# Patient Record
Sex: FEMALE | Race: WHITE
[De-identification: names, ages, dates, MRNs, and addresses within clinical notes are randomized per-mention and may not be internally consistent; named-entity substitution may affect disease eponyms.]

---

## 2019-02-08 ENCOUNTER — HOSPITAL ENCOUNTER (INPATIENT)
Dept: HOSPITAL 91 - MS1 | Age: 59
LOS: 7 days | Discharge: HOME | DRG: 418 | End: 2019-02-15
Payer: MEDICAID

## 2019-02-08 ENCOUNTER — HOSPITAL ENCOUNTER (INPATIENT)
Dept: HOSPITAL 10 - E/R | Age: 59
LOS: 7 days | Discharge: HOME | DRG: 418 | End: 2019-02-15
Payer: COMMERCIAL

## 2019-02-08 VITALS
HEIGHT: 63 IN | BODY MASS INDEX: 22.3 KG/M2 | HEIGHT: 63 IN | BODY MASS INDEX: 22.3 KG/M2 | WEIGHT: 125.88 LBS | WEIGHT: 125.88 LBS

## 2019-02-08 VITALS — RESPIRATION RATE: 18 BRPM | HEART RATE: 82 BPM | DIASTOLIC BLOOD PRESSURE: 70 MMHG | SYSTOLIC BLOOD PRESSURE: 133 MMHG

## 2019-02-08 DIAGNOSIS — K76.0: ICD-10-CM

## 2019-02-08 DIAGNOSIS — E11.9: ICD-10-CM

## 2019-02-08 DIAGNOSIS — E05.90: ICD-10-CM

## 2019-02-08 DIAGNOSIS — E87.1: ICD-10-CM

## 2019-02-08 DIAGNOSIS — K80.20: Primary | ICD-10-CM

## 2019-02-08 DIAGNOSIS — E88.09: ICD-10-CM

## 2019-02-08 DIAGNOSIS — N30.90: ICD-10-CM

## 2019-02-08 DIAGNOSIS — D64.9: ICD-10-CM

## 2019-02-08 DIAGNOSIS — M54.2: ICD-10-CM

## 2019-02-08 DIAGNOSIS — K59.00: ICD-10-CM

## 2019-02-08 DIAGNOSIS — M79.602: ICD-10-CM

## 2019-02-08 LAB
ADD MAN DIFF?: NO
ADD UMIC: YES
ALANINE AMINOTRANSFERASE: 40 IU/L (ref 13–69)
ALBUMIN/GLOBULIN RATIO: 1
ALBUMIN: 4.3 G/DL (ref 3.3–4.9)
ALKALINE PHOSPHATASE: 142 IU/L (ref 42–121)
ANION GAP: 10 (ref 5–13)
ASPARTATE AMINO TRANSFERASE: 38 IU/L (ref 15–46)
BASOPHIL #: 0.1 10^3/UL (ref 0–0.1)
BASOPHILS %: 0.6 % (ref 0–2)
BILIRUBIN,DIRECT: 0 MG/DL (ref 0–0.2)
BILIRUBIN,TOTAL: 0.4 MG/DL (ref 0.2–1.3)
BLOOD UREA NITROGEN: 12 MG/DL (ref 7–20)
CALCIUM: 10 MG/DL (ref 8.4–10.2)
CARBON DIOXIDE: 29 MMOL/L (ref 21–31)
CHLORIDE: 94 MMOL/L (ref 97–110)
CREATININE: 0.45 MG/DL (ref 0.44–1)
EOSINOPHILS #: 0.1 10^3/UL (ref 0–0.5)
EOSINOPHILS %: 0.8 % (ref 0–7)
GLOBULIN: 4.3 G/DL (ref 1.3–3.2)
GLUCOSE: 268 MG/DL (ref 70–220)
HEMATOCRIT: 40 % (ref 37–47)
HEMOGLOBIN: 13.2 G/DL (ref 12–16)
IMMATURE GRANS #M: 0.03 10^3/UL (ref 0–0.03)
IMMATURE GRANS % (M): 0.4 % (ref 0–0.43)
LIPASE: 236 U/L (ref 23–300)
LYMPHOCYTES #: 3.2 10^3/UL (ref 0.8–2.9)
LYMPHOCYTES %: 38.2 % (ref 15–51)
MEAN CORPUSCULAR HEMOGLOBIN: 29.4 PG (ref 29–33)
MEAN CORPUSCULAR HGB CONC: 33 G/DL (ref 32–37)
MEAN CORPUSCULAR VOLUME: 89.1 FL (ref 82–101)
MEAN PLATELET VOLUME: 10.3 FL (ref 7.4–10.4)
MONOCYTE #: 0.6 10^3/UL (ref 0.3–0.9)
MONOCYTES %: 7.1 % (ref 0–11)
NEUTROPHIL #: 4.5 10^3/UL (ref 1.6–7.5)
NEUTROPHILS %: 52.9 % (ref 39–77)
NUCLEATED RED BLOOD CELLS #: 0 10^3/UL (ref 0–0)
NUCLEATED RED BLOOD CELLS%: 0 /100WBC (ref 0–0)
PLATELET COUNT: 489 10^3/UL (ref 140–415)
POTASSIUM: 4.4 MMOL/L (ref 3.5–5.1)
RED BLOOD COUNT: 4.49 10^6/UL (ref 4.2–5.4)
RED CELL DISTRIBUTION WIDTH: 12.5 % (ref 11.5–14.5)
SODIUM: 133 MMOL/L (ref 135–144)
TOTAL PROTEIN: 8.6 G/DL (ref 6.1–8.1)
UR ASCORBIC ACID: NEGATIVE MG/DL
UR BILIRUBIN (DIP): NEGATIVE MG/DL
UR BLOOD (DIP): NEGATIVE MG/DL
UR CLARITY: CLEAR
UR COLOR: YELLOW
UR GLUCOSE (DIP): (no result) MG/DL
UR KETONES (DIP): NEGATIVE MG/DL
UR LEUKOCYTE ESTERASE (DIP): (no result) LEU/UL
UR MUCUS: (no result) /HPF
UR NITRITE (DIP): NEGATIVE MG/DL
UR PH (DIP): 6 (ref 5–9)
UR RBC: 3 /HPF (ref 0–5)
UR SPECIFIC GRAVITY (DIP): 1.02 (ref 1–1.03)
UR SQUAMOUS EPITHELIAL CELL: (no result) /HPF
UR TOTAL PROTEIN (DIP): NEGATIVE MG/DL
UR UROBILINOGEN (DIP): NEGATIVE MG/DL
UR WBC: 38 /HPF (ref 0–5)
WHITE BLOOD COUNT: 8.4 10^3/UL (ref 4.8–10.8)

## 2019-02-08 PROCEDURE — 84439 ASSAY OF FREE THYROXINE: CPT

## 2019-02-08 PROCEDURE — 88304 TISSUE EXAM BY PATHOLOGIST: CPT

## 2019-02-08 PROCEDURE — 96374 THER/PROPH/DIAG INJ IV PUSH: CPT

## 2019-02-08 PROCEDURE — 87086 URINE CULTURE/COLONY COUNT: CPT

## 2019-02-08 PROCEDURE — 80061 LIPID PANEL: CPT

## 2019-02-08 PROCEDURE — 84235 ASSAY OF ENDOCRINE HORMONE: CPT

## 2019-02-08 PROCEDURE — 36415 COLL VENOUS BLD VENIPUNCTURE: CPT

## 2019-02-08 PROCEDURE — G0378 HOSPITAL OBSERVATION PER HR: HCPCS

## 2019-02-08 PROCEDURE — 84100 ASSAY OF PHOSPHORUS: CPT

## 2019-02-08 PROCEDURE — 88307 TISSUE EXAM BY PATHOLOGIST: CPT

## 2019-02-08 PROCEDURE — 85025 COMPLETE CBC W/AUTO DIFF WBC: CPT

## 2019-02-08 PROCEDURE — 82962 GLUCOSE BLOOD TEST: CPT

## 2019-02-08 PROCEDURE — 84481 FREE ASSAY (FT-3): CPT

## 2019-02-08 PROCEDURE — 80053 COMPREHEN METABOLIC PANEL: CPT

## 2019-02-08 PROCEDURE — 88312 SPECIAL STAINS GROUP 1: CPT

## 2019-02-08 PROCEDURE — 88313 SPECIAL STAINS GROUP 2: CPT

## 2019-02-08 PROCEDURE — 85610 PROTHROMBIN TIME: CPT

## 2019-02-08 PROCEDURE — 96375 TX/PRO/DX INJ NEW DRUG ADDON: CPT

## 2019-02-08 PROCEDURE — 83690 ASSAY OF LIPASE: CPT

## 2019-02-08 PROCEDURE — 81001 URINALYSIS AUTO W/SCOPE: CPT

## 2019-02-08 PROCEDURE — 78226 HEPATOBILIARY SYSTEM IMAGING: CPT

## 2019-02-08 PROCEDURE — 93005 ELECTROCARDIOGRAM TRACING: CPT

## 2019-02-08 PROCEDURE — A9537 TC99M MEBROFENIN: HCPCS

## 2019-02-08 PROCEDURE — 84703 CHORIONIC GONADOTROPIN ASSAY: CPT

## 2019-02-08 PROCEDURE — 74176 CT ABD & PELVIS W/O CONTRAST: CPT

## 2019-02-08 PROCEDURE — 83735 ASSAY OF MAGNESIUM: CPT

## 2019-02-08 PROCEDURE — 76705 ECHO EXAM OF ABDOMEN: CPT

## 2019-02-08 PROCEDURE — 84484 ASSAY OF TROPONIN QUANT: CPT

## 2019-02-08 PROCEDURE — 84443 ASSAY THYROID STIM HORMONE: CPT

## 2019-02-08 PROCEDURE — 83036 HEMOGLOBIN GLYCOSYLATED A1C: CPT

## 2019-02-08 PROCEDURE — 71045 X-RAY EXAM CHEST 1 VIEW: CPT

## 2019-02-08 PROCEDURE — 99285 EMERGENCY DEPT VISIT HI MDM: CPT

## 2019-02-08 RX ADMIN — HYDROMORPHONE HYDROCHLORIDE 1 MG: 1 INJECTION, SOLUTION INTRAMUSCULAR; INTRAVENOUS; SUBCUTANEOUS at 20:16

## 2019-02-08 RX ADMIN — THIAMINE HYDROCHLORIDE 1 MLS/HR: 100 INJECTION, SOLUTION INTRAMUSCULAR; INTRAVENOUS at 20:16

## 2019-02-08 RX ADMIN — ATORVASTATIN CALCIUM 1 MG: 80 TABLET, FILM COATED ORAL at 22:23

## 2019-02-08 RX ADMIN — ONDANSETRON HYDROCHLORIDE 1 MG: 2 INJECTION, SOLUTION INTRAMUSCULAR; INTRAVENOUS at 23:38

## 2019-02-08 RX ADMIN — THIAMINE HYDROCHLORIDE 1 MLS/HR: 100 INJECTION, SOLUTION INTRAMUSCULAR; INTRAVENOUS at 22:23

## 2019-02-08 RX ADMIN — ONDANSETRON HYDROCHLORIDE 1 MG: 2 INJECTION, SOLUTION INTRAMUSCULAR; INTRAVENOUS at 20:16

## 2019-02-08 RX ADMIN — FOLIC ACID SCH MLS/HR: 5 INJECTION, SOLUTION INTRAMUSCULAR; INTRAVENOUS; SUBCUTANEOUS at 22:23

## 2019-02-08 RX ADMIN — ATORVASTATIN CALCIUM SCH MG: 80 TABLET, FILM COATED ORAL at 22:23

## 2019-02-08 RX ADMIN — DEXAMETHASONE SODIUM PHOSPHATE PRN MG: 10 INJECTION, SOLUTION INTRAMUSCULAR; INTRAVENOUS at 23:38

## 2019-02-08 NOTE — ERD
ER Documentation


Chief Complaint


Chief Complaint





RUQ pain [id number] worse today with nausea and diarrhea





HPI


58-year-old female who presents the emergency room with 1 month of right-sided 


abdominal pain the patient has been evaluated outside hospitals in the past and 


been diagnosed with cholelithiasis.  Patient describes persistent abdominal pain


that is right-sided lower greater than upper radiating to her right flank.  She 


denies any fevers or chills.  Mild nausea but no vomiting and slightly loose 


stools.  Pain is 10 out of 10 currently.





ROS


All systems reviewed and are negative except as per history of present illness.





Medications


Home Meds


Reported Medications


Ondansetron Hcl* (Zofran*) 4 Mg Tablet, 4 MG PO Q8, TAB


   2/8/19


Tramadol Hcl* (Ultram*) 50 Mg Tablet, 50 MG PO Q12H PRN for PAIN, TAB


   2/8/19


Metformin Hcl* (Metformin Hcl*) 500 Mg Tablet, 500 MG PO WITH BREAKFAST DINNE, 


#60 TAB


   2/8/19


Atorvastatin* (Atorvastatin*) 80 Mg Tablet, 80 MG PO QHS, #30 TAB


   2/8/19





Allergies


Allergies:  


Coded Allergies:  


     No Known Allergy (Unverified , 2/8/19)





FmHx


Family History:  No diabetes





Physical Exam


Vitals





Vital Signs


  Date      Temp  Pulse  Resp  B/P (MAP)   Pulse Ox  O2          O2 Flow    FiO2


Time                                                 Delivery    Rate


    2/8/19  98.9     89    18      134/86       100  Room Air


     20:22                          (102)


    2/8/19  99.2     97    18      178/84       100


     18:07                          (115)





Physical Exam


General: Uncomfortable


Head: Normocephalic, atraumatic.


Eyes: Pupils equally reactive, EOM intact


ENT: Moist mucous membranes


Neck: Supple, no lymphadenopathy


Respiratory: Lungs clear bilaterally, no distress


Cardiovascular: RRR, no murmurs, rubs, or gallops


Abdominal: Soft, right-sided tenderness with tenderness to palpation in the 


right upper quadrant and right lower quadrant, mild voluntary guarding


: Deferred


MSK: No edema, no unilateral swelling, 5/5 strength


Neurologic: Alert and oriented, moving all extremities, normal speech, no focal 


weakness, no cerebellar signs


Skin: No rash


Psych: Normal mood


Result Diagram:  


2/8/19 2007 2/8/19 2007





Results 24 hrs





Laboratory Tests


              Test
                                   2/8/19
20:07


              White Blood Count                       8.4 10^3/ul


              Red Blood Count                        4.49 10^6/ul


              Hemoglobin                                13.2 g/dl


              Hematocrit                                   40.0 %


              Mean Corpuscular Volume                     89.1 fl


              Mean Corpuscular Hemoglobin                 29.4 pg


              Mean Corpuscular Hemoglobin
Concent      33.0 g/dl 



              Red Cell Distribution Width                  12.5 %


              Platelet Count                          489 10^3/UL


              Mean Platelet Volume                        10.3 fl


              Immature Granulocytes %                     0.400 %


              Neutrophils %                                52.9 %


              Lymphocytes %                                38.2 %


              Monocytes %                                   7.1 %


              Eosinophils %                                 0.8 %


              Basophils %                                   0.6 %


              Nucleated Red Blood Cells %             0.0 /100WBC


              Immature Granulocytes #               0.030 10^3/ul


              Neutrophils #                           4.5 10^3/ul


              Lymphocytes #                           3.2 10^3/ul


              Monocytes #                             0.6 10^3/ul


              Eosinophils #                           0.1 10^3/ul


              Basophils #                             0.1 10^3/ul


              Nucleated Red Blood Cells #             0.0 10^3/ul


              Urine Color                          YELLOW


              Urine Clarity                        CLEAR


              Urine pH                                        6.0


              Urine Specific Gravity                        1.021


              Urine Ketones                        NEGATIVE mg/dL


              Urine Nitrite                        NEGATIVE mg/dL


              Urine Bilirubin                      NEGATIVE mg/dL


              Urine Urobilinogen                   NEGATIVE mg/dL


              Urine Leukocyte Esterase                  2+ Marge/ul


              Urine Microscopic RBC                        3 /HPF


              Urine Microscopic WBC                       38 /HPF


              Urine Squamous Epithelial
Cells      FEW /HPF 



              Urine Mucus                          MODERATE /HPF


              Urine Hemoglobin                     NEGATIVE mg/dL


              Urine Glucose                              3+ mg/dL


              Urine Total Protein                  NEGATIVE mg/dl


              Sodium Level                             133 mmol/L


              Potassium Level                          4.4 mmol/L


              Chloride Level                            94 mmol/L


              Carbon Dioxide Level                      29 mmol/L


              Anion Gap                                        10


              Blood Urea Nitrogen                        12 mg/dl


              Creatinine                               0.45 mg/dl


              Est Glomerular Filtrat Rate
mL/min   > 60 mL/min 



              Glucose Level                             268 mg/dl


              Calcium Level                            10.0 mg/dl


              Total Bilirubin                           0.4 mg/dl


              Direct Bilirubin                         0.00 mg/dl


              Indirect Bilirubin                        0.4 mg/dl


              Aspartate Amino Transf
(AST/SGOT)          38 IU/L 



              Alanine Aminotransferase
(ALT/SGPT)        40 IU/L 



              Alkaline Phosphatase                       142 IU/L


              Total Protein                              8.6 g/dl


              Albumin                                    4.3 g/dl


              Globulin                                  4.30 g/dl


              Albumin/Globulin Ratio                         1.00


              Lipase                                      236 U/L





Current Medications


 Medications
   Dose
          Sig/Taj
       Start Time
   Status  Last


 (Trade)       Ordered        Route
 PRN     Stop Time              Admin
Dose


                              Reason                                Admin


 Sodium         1,000 ml @ 
   Q1H STAT
      2/8/19        DC            2/8/19


Chloride       1,000 mls/hr   IV
            20:02
 2/8/19                20:16



                                             21:01


                1 mg           ONCE  STAT
    2/8/19        DC            2/8/19


Hydromorphone                 IV
            20:02
 2/8/19                20:16



HCl
                                         20:03


(Dilaudid)


 Ondansetron    4 mg           ONCE  STAT
    2/8/19        DC            2/8/19


HCl
  (Zofran                 IV
            20:02
 2/8/19                20:16



Inj)                                         20:03


 Sodium         1,000 ml @ 
   Q10H
 IV
      2/8/19                 



Chloride       100 mls/hr                    21:30



 IV Flush
      3 ml           PER            2/8/19                 



(NS 3 ml)                     PROTOCOL
 IV
  21:30



 Ondansetron    4 mg           Q6H  PRN
      2/8/19                 



HCl
  (Zofran                 IV
            21:30



Inj)                          NAUSEA/VOMITI


                              NG


                0.5 mg         Q4H  PRN
      2/8/19                 



Hydromorphone                 IV
 .SEVERE    21:30



HCl
                          PAIN 7-10


(Dilaudid)


 Docusate       100 mg         Q12H  PRN
     2/8/19                 



Sodium
                       PO
            21:30



(Colace)                      .CONSTIPATION


 Bisacodyl
     5 mg           DAILY  PRN
    2/8/19                 



(Dulcolax)                    PO
            21:30



                              .CONSTIPATION


                80 mg          QHS
 PO
       2/9/19        DC       



Atorvastatin
                                21:00
 2/9/19


Calcium
                                     21:00


(Lipitor)


                80 mg          QHS
 PO
       2/8/19                 



Atorvastatin
                                22:00



Calcium



(Lipitor)








Procedures/MDM


EKG, MONITORS, & DIAGNOSTIC IMAGING:


Gallbladder ultrasound:


IMPRESSION:


Cholelithiasis without evidence of acute cholecystitis.


 


Hepatic steatosis.


 


 


RPTAT:  AA





CT abdomen and pelvis:


IMPRESSION:


No acute abnormalities in the abdomen or pelvis. 


 


Cholelithiasis without CT evidence of acute cholecystitis.


 


Atherosclerotic disease.


 


 


RPTAT: AA





LAB INTERPRETATION:


* CBC without evidence of infection, chemistry profile without evidence of 


  hepatobiliary obstruction





MEDICAL DECISION MAKING:


Patient presents with right-sided abdominal pain.  She is describing and 


pointing to her pain is the lower abdomen which raises the concern for possible 


appendicitis or colonic process.  However, the patient has a known history of c


holelithiasis.  I have a strong suspicion for biliary colic or acute 


cholecystitis or acute choledocholithiasis.  CT imaging and ultrasound imaging 


will be appropriate.  Patient benefit from IV fluids and pain control 


medication.





ER COURSE:


* Patient still has persistent pain.  The patient likely has biliary colic.  


  Patient has been bouncing around different hospitals with the diagnosis of 


  biliary colic and is at risk for gallstone pancreatitis.  I would recommend 


  inpatient hospitalization if the patient's pain cannot be controlled.  She 


  feels comfortable with this plan.  Dr. Styles notified.  He will consult.


* No indication for antibiotics.





CONSULTATION:


[None]





DISPOSITION PLAN: Medical surgical admission for intractable abdominal pain 


secondary to biliary colic


Accepting care team and consultations:


I discussed the current laboratory data, diagnostic imaging and emergency care 


provided.


Admitting team: Dr. Newell


Admitting team indication: Insurance directed





Departure


Diagnosis:  


   Primary Impression:  


   Abdominal pain


   Abdominal location:  right upper quadrant  Qualified Codes:  R10.11 - Right 


   upper quadrant pain


   Additional Impressions:  


   Biliary colic


   Intractable abdominal pain


Condition:  Stable











LEN STRICKLAND MD           Feb 8, 2019 20:12

## 2019-02-08 NOTE — HP
Date/Time of Note


Date/Time of Note


DATE: 2/8/19 


TIME: 21:35





Assessment/Plan


VTE Prophylaxis


SCD applied (from Nsg):  Yes


Pharmacological prophylaxis:  NA/contraindicated


Pharm contraindication:  low risk/ambulating





Lines/Catheters


IV Catheter Type (from Nrsg):  Saline Lock





Assessment/Plan


Hospital Course


This is a 58-year-old female being admitted to the [facility] floor for:





#1 symptomatic cholelithiasis: Patient has been dealing with gallstones 


approximately 1 month.  Has been to multiple hospitals.  General surgery has 


been consulted by the ED, will await their recommendations.  At the current time


I will give her a dose of prophylactic Zosyn in the a.m. in case does go to the 


operating room.  She remains afebrile.  But she is in pain.  We will keep the 


patient n.p.o. except meds, pain control.





#2 urinary tract infection: Urine culture, ceftriaxone 1 g every 24 hours





#3 diabetes mellitus: We will check hemoglobin A 1C, insulin sliding scale, 


adjust diabetes medications as indicated





#4 DVT GI prophylaxis: SCDs, no GI prophylaxis indicated





Further treatment strategy will be implemented as per the clinical course


Result Diagram:  


2/8/19 2007 2/8/19 2007





Results 24hrs





Laboratory Tests


               Test
                                2/8/19
20:07


               White Blood Count                           8.4


               Red Blood Count                            4.49


               Hemoglobin                                 13.2


               Hematocrit                                 40.0


               Mean Corpuscular Volume                    89.1


               Mean Corpuscular Hemoglobin                29.4


               Mean Corpuscular Hemoglobin
Concent       33.0  



               Red Cell Distribution Width                12.5


               Platelet Count                             489  H


               Mean Platelet Volume                       10.3


               Immature Granulocytes %                   0.400


               Neutrophils %                              52.9


               Lymphocytes %                              38.2


               Monocytes %                                 7.1


               Eosinophils %                               0.8


               Basophils %                                 0.6


               Nucleated Red Blood Cells %                 0.0


               Immature Granulocytes #                   0.030


               Neutrophils #                               4.5


               Lymphocytes #                              3.2  H


               Monocytes #                                 0.6


               Eosinophils #                               0.1


               Basophils #                                 0.1


               Nucleated Red Blood Cells #                 0.0


               Urine Color                          YELLOW


               Urine Clarity                        CLEAR


               Urine pH                                    6.0


               Urine Specific Gravity                    1.021


               Urine Ketones                        NEGATIVE


               Urine Nitrite                        NEGATIVE


               Urine Bilirubin                      NEGATIVE


               Urine Urobilinogen                   NEGATIVE


               Urine Leukocyte Esterase                    2+  H


               Urine Microscopic RBC                         3


               Urine Microscopic WBC                       38  H


               Urine Squamous Epithelial
Cells      FEW  



               Urine Mucus                          MODERATE


               Urine Hemoglobin                     NEGATIVE


               Urine Glucose                               3+  H


               Urine Total Protein                  NEGATIVE


               Sodium Level                               133  L


               Potassium Level                             4.4


               Chloride Level                              94  L


               Carbon Dioxide Level                         29


               Anion Gap                                    10


               Blood Urea Nitrogen                          12


               Creatinine                                 0.45


               Est Glomerular Filtrat Rate
mL/min   > 60  



               Glucose Level                              268  H


               Calcium Level                              10.0


               Total Bilirubin                             0.4


               Direct Bilirubin                           0.00


               Indirect Bilirubin                          0.4


               Aspartate Amino Transf
(AST/SGOT)           38  



               Alanine Aminotransferase
(ALT/SGPT)         40  



               Alkaline Phosphatase                       142  H


               Total Protein                              8.6  H


               Albumin                                     4.3


               Globulin                                  4.30  H


               Albumin/Globulin Ratio                     1.00


               Lipase                                      236








HPI/ROS


Admit Date/Time


Admit Date/Time








Hx of Present Illness


Chief complaint: Right upper quadrant abdominal pain times 1 month





This is a 58-year-old female who presents the emergency room with 1 month of 


right-sided abdominal pain the patient has been evaluated outside hospitals in 


the past and been diagnosed with cholelithiasis.  Patient describes persistent 


abdominal pain that is right-sided lower greater than upper radiating to her 


right flank.  She denies any fevers or chills.  Mild nausea but no vomiting and 


slightly loose stools.  When patient was in the emergency room she did report 10


out of 10 pain.  Patient reports that she was admitted to [place] and was 


scheduled to have a cholecystectomy however given that she was not a [place] 


member she was not able to have the surgery there.





Allergies: NKDA





Medications: See MAR





ROS


Const: As per HPI


Eyes : No pain discharge or redness or change in visual acuity


ENT: No pain, sore throat, congestion, congestion,  dysphagia or discharge


Respiratory: No shortness of breath, cough, sputum, wheezing, or pleuritic pain


Cardiovascular: No chest pain, palpitation, PND, or edema


GI : As per HPI


Genitourinary: Urinary frequency


Musculoskeletal: No joint pain, back pain, neck pain, restricted range of motion


in neck or joints


Skin: No rash, bruising or hives 


Neuro: No headache, dizziness, syncope, seizure, focal weakness


Endocrine: No polyuria, polydipsia, temperature intolerance


Psych: No hallucination, depression, anxiety or suicidal ideation


Additional Comments


PROCEDURE:   CT abdomen and pelvis without contrast


 


CLINICAL INDICATION:   abdominal pain 


 


TECHNIQUE:   CT scan of the abdomen and pelvis without contrast was performed on


a multislice CT scanner.  3-D sagittal and coronal reformatted images were 


obtained from the axial source images. 


DICOM images are available.


 


One or more of the following post reduction techniques were used:


 


- Automated exposure control.


- Adjustment of the mA and/or Kv according to patient's size.


- Use of iterative reconstruction technique


 


 mGycm


CTDIvol 5.6 mGy


 


COMPARISON:   None 


 


FINDINGS:


 


Visualized lung bases: Unremarkable  


 


Liver: Unremarkable  


 


Gallbladder: There are multiple intraluminal gallstones.  


 


Spleen: Unremarkable  


 


Pancreas: Unremarkable  


 


Adrenal glands: Unremarkable  


 


Kidneys: Unremarkable    


 


GI Tract: Unremarkable  


 


Vasculature: Mild aortoiliac atherosclerotic disease.  


 


Lymphadenopathy: Absent


 


Peritoneal cavity: No ascites


 


Bladder: The bladder is distended.  


 


Reproductive organs: Unremarkable  


 


Bones: Degenerative changes of the spine.  


 


Extraperitoneal soft tissues: Unremarkable  


 


Lines/drains/medical devices: None


 


 


IMPRESSION:


No acute abnormalities in the abdomen or pelvis. 


 


Cholelithiasis without CT evidence of acute cholecystitis.


 


Atherosclerotic disease.


 


 


RPTAT: AA


_____________________________________________ 


Physician Evelyn           Date    Time 


Electronically viewed and signed by Mariajose Goddard Physician on 


02/08/2019 21:09 


 


D:  02/08/2019 21:09  T:  02/08/2019 21:09


RB/





CC: LEN STRICKLAND MD





[id number]


PROCEDURE:   US Abdomen Limited. 


 


CLINICAL INDICATION:   Abdominal pain 


 


TECHNIQUE:   Multiple real-time images were acquired of the patient's abdomen 


utilizing a high resolution transducer. 


 


COMPARISON:   None 


 


FINDINGS:


 


Liver: Normal in size measuring 16.5 cm.  Increased echogenicity.  No focal 


lesions.


 


Gallbladder: No intraluminal gallstones.  No pericholecystic fluid.  No wall 


thickening.


 


Biliary tract:  No intra- or extrahepatic ductal dilation.   The common bile 


duct measures 5 mm in maximal dimension. 


 


Pancreas:  Poorly visualized due to excessive bowel gas. 


 


Right Kidney:  Normal in size measuring 10.0 cm. Normal echogenicity. No 


dilatation of the pelvicaliceal system. No areas of increased echogenicity to 


suggest nephrolithiasis.  No solid renal mass. 


 


Other: No free fluid is identified.


 


 


IMPRESSION:


Cholelithiasis without evidence of acute cholecystitis.


 


Hepatic steatosis.


 


 


RPTAT:  AA


_____________________________________________ 


Mariajose Goddard Physician           Date    Time 


Electronically viewed and signed by Mariajose Goddard Physician on 


02/08/2019 21:10 


 


D:  02/08/2019 21:10  T:  02/08/2019 21:10


RB/





CC: LEN STRICKLAND MD





[id number]





PMH/Family/Social


Past Medical History


Diabetes mellitus, history of ovarian tumor


Medications





Current Medications


Sodium Chloride 1,000 ml @  100 mls/hr Q10H IV ;  Start 2/8/19 at 21:30


IV Flush (NS 3 ml) 3 ml PER PROTOCOL IV ;  Start 2/8/19 at 21:30


Ondansetron HCl (Zofran Inj) 4 mg Q6H  PRN IV NAUSEA/VOMITING;  Start 2/8/19 at 


21:30


Hydromorphone HCl (Dilaudid) 0.5 mg Q4H  PRN IV .SEVERE PAIN 7-10;  Start 2/8/19


at 21:30


Docusate Sodium (Colace) 100 mg Q12H  PRN PO .CONSTIPATION;  Start 2/8/19 at 


21:30


Bisacodyl (Dulcolax) 5 mg DAILY  PRN PO .CONSTIPATION;  Start 2/8/19 at 21:30


Coded Allergies:  


     No Known Allergy (Unverified , 2/8/19)





Past Surgical History


Ovarian tumor removal, unknown which side





Family History


Significant Family History:  no pertinent family hx





Social History


Alcohol Use:  none


Smoking Status:  Never smoker


Drug Use:  none





Exam/Review of Systems


Vital Signs


Vitals





Vital Signs


  Date      Temp  Pulse  Resp  B/P (MAP)   Pulse Ox  O2          O2 Flow    FiO2


Time                                                 Delivery    Rate


    2/8/19  98.9     89    18      134/86       100  Room Air


     20:22                          (102)








Exam


Exam





General: Patient is currently lying in bed, she does appear uncomfortable from 


right upper quadrant pain


HEENT: Atraumatic, normocephalic. The pupils are equal, round and reactive. 


Extraocular motor are intact


Neck: Supple with full range of motion. No rigidity or meningismus


Chest: Nontender


Lungs: Clear to auscultation bilaterally no crackles rales or wheezing


Heart: Normal S1-S2, Regular rhythm and rate. No murmur, S3, or S4


Abdomen: Soft , tenderness of the right upper quadrant, nondistended , bowel 


sounds are present. No guarding no rebound tenderness , No masses or org


anomegaly. No costovertebral temporal angle mass


Genitourinary: Mild suprapubic tenderness palpation


Extremities: Normal to inspection, no edema no cyanosis


Neurologic: Normal mental status, speech normal, cranial nerves II through XII 


are intact, motor and sensory are intact, no focal weakness


Additional Comments


PROCEDURE:   CT abdomen and pelvis without contrast


 


CLINICAL INDICATION:   abdominal pain 


 


TECHNIQUE:   CT scan of the abdomen and pelvis without contrast was performed on


a multislice CT scanner.  3-D sagittal and coronal reformatted images were 


obtained from the axial source images. 


DICOM images are available.


 


One or more of the following post reduction techniques were used:


 


- Automated exposure control.


- Adjustment of the mA and/or Kv according to patient's size.


- Use of iterative reconstruction technique


 


 mGycm


CTDIvol 5.6 mGy


 


COMPARISON:   None 


 


FINDINGS:


 


Visualized lung bases: Unremarkable  


 


Liver: Unremarkable  


 


Gallbladder: There are multiple intraluminal gallstones.  


 


Spleen: Unremarkable  


 


Pancreas: Unremarkable  


 


Adrenal glands: Unremarkable  


 


Kidneys: Unremarkable    


 


GI Tract: Unremarkable  


 


Vasculature: Mild aortoiliac atherosclerotic disease.  


 


Lymphadenopathy: Absent


 


Peritoneal cavity: No ascites


 


Bladder: The bladder is distended.  


 


Reproductive organs: Unremarkable  


 


Bones: Degenerative changes of the spine.  


 


Extraperitoneal soft tissues: Unremarkable  


 


Lines/drains/medical devices: None


 


 


IMPRESSION:


No acute abnormalities in the abdomen or pelvis. 


 


Cholelithiasis without CT evidence of acute cholecystitis.


 


Atherosclerotic disease.


 


 


RPTAT: AA


_____________________________________________ 


Physician Evelyn           Date    Time 


Electronically viewed and signed by Physician Evelyn on 


02/08/2019 21:09 


 


D:  02/08/2019 21:09  T:  02/08/2019 21:09


RB/





CC: LEN STRICKLAND MD





[id number]


PROCEDURE:   US Abdomen Limited. 


 


CLINICAL INDICATION:   Abdominal pain 


 


TECHNIQUE:   Multiple real-time images were acquired of the patient's abdomen 


utilizing a high resolution transducer. 


 


COMPARISON:   None 


 


FINDINGS:


 


Liver: Normal in size measuring 16.5 cm.  Increased echogenicity.  No focal 


lesions.


 


Gallbladder: No intraluminal gallstones.  No pericholecystic fluid.  No wall 


thickening.


 


Biliary tract:  No intra- or extrahepatic ductal dilation.   The common bile 


duct measures 5 mm in maximal dimension. 


 


Pancreas:  Poorly visualized due to excessive bowel gas. 


 


Right Kidney:  Normal in size measuring 10.0 cm. Normal echogenicity. No 


dilatation of the pelvicaliceal system. No areas of increased echogenicity to 


suggest nephrolithiasis.  No solid renal mass. 


 


Other: No free fluid is identified.


 


 


IMPRESSION:


Cholelithiasis without evidence of acute cholecystitis.


 


Hepatic steatosis.


 


 


RPTAT:  AA


_____________________________________________ 


Physician Evelyn           Date    Time 


Electronically viewed and signed by Physician Evelyn on 


02/08/2019 21:10 


 


D:  02/08/2019 21:10  T:  02/08/2019 21:10


RB/





CC: LEN STRICKLAND MD





[id number]











JULIA HYLTON                  Feb 8, 2019 21:35

## 2019-02-09 VITALS — DIASTOLIC BLOOD PRESSURE: 72 MMHG | RESPIRATION RATE: 20 BRPM | SYSTOLIC BLOOD PRESSURE: 129 MMHG

## 2019-02-09 VITALS — DIASTOLIC BLOOD PRESSURE: 61 MMHG | RESPIRATION RATE: 20 BRPM | SYSTOLIC BLOOD PRESSURE: 117 MMHG

## 2019-02-09 VITALS — HEART RATE: 68 BPM | SYSTOLIC BLOOD PRESSURE: 120 MMHG | DIASTOLIC BLOOD PRESSURE: 66 MMHG | RESPIRATION RATE: 18 BRPM

## 2019-02-09 VITALS — SYSTOLIC BLOOD PRESSURE: 141 MMHG | HEART RATE: 80 BPM | DIASTOLIC BLOOD PRESSURE: 71 MMHG | RESPIRATION RATE: 18 BRPM

## 2019-02-09 LAB
ADD MAN DIFF?: NO
ALANINE AMINOTRANSFERASE: 34 IU/L (ref 13–69)
ALBUMIN/GLOBULIN RATIO: 1.03
ALBUMIN: 3.2 G/DL (ref 3.3–4.9)
ALKALINE PHOSPHATASE: 110 IU/L (ref 42–121)
ANION GAP: 7 (ref 5–13)
ASPARTATE AMINO TRANSFERASE: 31 IU/L (ref 15–46)
BASOPHIL #: 0 10^3/UL (ref 0–0.1)
BASOPHILS %: 0.7 % (ref 0–2)
BILIRUBIN,DIRECT: 0 MG/DL (ref 0–0.2)
BILIRUBIN,TOTAL: 0.4 MG/DL (ref 0.2–1.3)
BLOOD UREA NITROGEN: 9 MG/DL (ref 7–20)
CALCIUM: 8.8 MG/DL (ref 8.4–10.2)
CARBON DIOXIDE: 27 MMOL/L (ref 21–31)
CHLORIDE: 100 MMOL/L (ref 97–110)
CHOL/HDL RATIO: 4.9 RATIO
CHOLESTEROL: 99 MG/DL (ref 100–200)
CREATININE: 0.38 MG/DL (ref 0.44–1)
EOSINOPHILS #: 0 10^3/UL (ref 0–0.5)
EOSINOPHILS %: 0.7 % (ref 0–7)
FREE T4 (FREE THYROXINE): 2.12 NG/DL (ref 0.64–1.79)
GLOBULIN: 3.1 G/DL (ref 1.3–3.2)
GLUCOSE: 299 MG/DL (ref 70–220)
HDL CHOLESTEROL: 20 MG/DL (ref 37–92)
HEMATOCRIT: 33.2 % (ref 37–47)
HEMOGLOBIN A1C: 10.9 % (ref 0–5.9)
HEMOGLOBIN: 10.9 G/DL (ref 12–16)
IMMATURE GRANS #M: 0.01 10^3/UL (ref 0–0.03)
IMMATURE GRANS % (M): 0.2 % (ref 0–0.43)
LDL CHOLESTEROL,CALCULATED: 60 MG/DL
LYMPHOCYTES #: 2.1 10^3/UL (ref 0.8–2.9)
LYMPHOCYTES %: 35.1 % (ref 15–51)
MAGNESIUM: 2.2 MG/DL (ref 1.7–2.5)
MEAN CORPUSCULAR HEMOGLOBIN: 29.4 PG (ref 29–33)
MEAN CORPUSCULAR HGB CONC: 32.8 G/DL (ref 32–37)
MEAN CORPUSCULAR VOLUME: 89.5 FL (ref 82–101)
MEAN PLATELET VOLUME: 10.7 FL (ref 7.4–10.4)
MONOCYTE #: 0.5 10^3/UL (ref 0.3–0.9)
MONOCYTES %: 7.8 % (ref 0–11)
NEUTROPHIL #: 3.4 10^3/UL (ref 1.6–7.5)
NEUTROPHILS %: 55.5 % (ref 39–77)
NUCLEATED RED BLOOD CELLS #: 0 10^3/UL (ref 0–0)
NUCLEATED RED BLOOD CELLS%: 0 /100WBC (ref 0–0)
PLATELET COUNT: 425 10^3/UL (ref 140–415)
POTASSIUM: 4.7 MMOL/L (ref 3.5–5.1)
RED BLOOD COUNT: 3.71 10^6/UL (ref 4.2–5.4)
RED CELL DISTRIBUTION WIDTH: 12.3 % (ref 11.5–14.5)
SODIUM: 134 MMOL/L (ref 135–144)
THYROID STIMULATING HORMONE: 0.15 MIU/L (ref 0.47–4.68)
TOTAL PROTEIN: 6.3 G/DL (ref 6.1–8.1)
TRIGLYCERIDES: 96 MG/DL (ref 0–149)
WHITE BLOOD COUNT: 6.1 10^3/UL (ref 4.8–10.8)

## 2019-02-09 RX ADMIN — ATORVASTATIN CALCIUM 1 MG: 80 TABLET, FILM COATED ORAL at 20:16

## 2019-02-09 RX ADMIN — INSULIN ASPART 1 UNIT: 100 INJECTION, SOLUTION INTRAVENOUS; SUBCUTANEOUS at 15:20

## 2019-02-09 RX ADMIN — HYDROMORPHONE HYDROCHLORIDE PRN MG: 1 INJECTION, SOLUTION INTRAMUSCULAR; INTRAVENOUS; SUBCUTANEOUS at 20:21

## 2019-02-09 RX ADMIN — INSULIN GLARGINE SCH UNITS: 100 INJECTION, SOLUTION SUBCUTANEOUS at 20:16

## 2019-02-09 RX ADMIN — CEFTRIAXONE 1 MLS/HR: 1 INJECTION, SOLUTION INTRAVENOUS at 05:25

## 2019-02-09 RX ADMIN — ATORVASTATIN CALCIUM SCH MG: 80 TABLET, FILM COATED ORAL at 20:16

## 2019-02-09 RX ADMIN — FOLIC ACID SCH MLS/HR: 5 INJECTION, SOLUTION INTRAMUSCULAR; INTRAVENOUS; SUBCUTANEOUS at 08:14

## 2019-02-09 RX ADMIN — ONDANSETRON HYDROCHLORIDE 1 MG: 2 INJECTION, SOLUTION INTRAMUSCULAR; INTRAVENOUS at 20:21

## 2019-02-09 RX ADMIN — PIPERACILLIN SODIUM AND TAZOBACTAM SODIUM 1 MLS/HR: 3; .375 INJECTION, POWDER, LYOPHILIZED, FOR SOLUTION INTRAVENOUS at 08:14

## 2019-02-09 RX ADMIN — CEFTRIAXONE SCH MLS/HR: 1 INJECTION, SOLUTION INTRAVENOUS at 05:25

## 2019-02-09 RX ADMIN — DEXAMETHASONE SODIUM PHOSPHATE PRN MG: 10 INJECTION, SOLUTION INTRAMUSCULAR; INTRAVENOUS at 20:21

## 2019-02-09 RX ADMIN — INSULIN ASPART 1 UNIT: 100 INJECTION, SOLUTION INTRAVENOUS; SUBCUTANEOUS at 20:15

## 2019-02-09 RX ADMIN — INSULIN GLARGINE 1 UNITS: 100 INJECTION, SOLUTION SUBCUTANEOUS at 20:16

## 2019-02-09 RX ADMIN — HYDROMORPHONE HYDROCHLORIDE 1 MG: 1 INJECTION, SOLUTION INTRAMUSCULAR; INTRAVENOUS; SUBCUTANEOUS at 20:21

## 2019-02-09 RX ADMIN — THIAMINE HYDROCHLORIDE 1 MLS/HR: 100 INJECTION, SOLUTION INTRAMUSCULAR; INTRAVENOUS at 08:14

## 2019-02-09 RX ADMIN — FOLIC ACID SCH MLS/HR: 5 INJECTION, SOLUTION INTRAMUSCULAR; INTRAVENOUS; SUBCUTANEOUS at 20:16

## 2019-02-09 RX ADMIN — INSULIN ASPART 1 UNIT: 100 INJECTION, SOLUTION INTRAVENOUS; SUBCUTANEOUS at 18:09

## 2019-02-09 RX ADMIN — THIAMINE HYDROCHLORIDE 1 MLS/HR: 100 INJECTION, SOLUTION INTRAMUSCULAR; INTRAVENOUS at 20:16

## 2019-02-09 NOTE — NUR
END OF SHIFT NOTE:

Pt went down for HIDA scan today. Results reviewed by Gaye MARINA, NPO d/c, pt started on clear 
liquid diet, no plans for surgery currently. AccuCheck ACHS added. VSS, call bell within 
reach, hourly rounding maintained.

## 2019-02-09 NOTE — CONS
Assessment/Plan


Assessment/Plan


Hospital Course (Demo Recall)


1.  Cholelithiasis:?  Asymptomatic; HIDA noted


-Diet trial-low-fat low-cholesterol> if prandial pain can consider sx


2.  Abdominal pain: 2/2?  #1 versus other


-As above


-Pain management


3.Thrombocytosis:


-Trend


-Further workup if persistent


4.  Normocytic normochromic anemia:


-Monitor and transfuse as needed


5.  Hyponatremia:


-Judicious fluid correction


6.  Controlled diabetes:


-Glucose control


7.  Hypothyroidism:


-Med management


8.  Hypoalbuminemia:


-Eventual nutrition optimization


9.  Pyuria


-Frequent bladder emptying





Thank you.  Patient seen and examined in collaboration with Dr. Dawit Styles.





Consultation Date/Type/Reason


Admit Date/Time





Date of Consultation:  Feb 9, 2019


Type of Consult


surgical


Reason for Consultation


abdominal pain


Requesting Provider:  JULIA HYLTON


Date/Time of Note


DATE: 2/9/19 


TIME: 16:59





Hx of Present Illness


 Mercedes Garcia is a 58-year-old woman with past medical history of diabetes and 


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 


with complaints of abdominal pain times 1 month.  Abdominal pain is 


predominantly in the right lower quadrant burning in nature.  Pain is reportedly


worsening over the past 1 month.  Pain is persistent without any noted 


exacerbating factors.  Alleviating factors include analgesics.  Associated 


symptoms include nausea without vomiting and loose stools.  She denies fevers, 


chills, congested cough chest pain, palpitations, dysuria, hematuria, 


hematochezia or dyschezia, hematemesis, melena, vaginal discharge or bleeding.  


CT of the abdomen shows cholelithiasis without evidence of acute cholecystitis. 


Laboratory findings are unremarkable.  General surgery was asked to evaluate.


12 point review of systems was performed and is negative except as stated in 


HPI.





Past Medical History


As above


Home Meds


Reported Medications


Ondansetron Hcl* (Zofran*) 4 Mg Tablet, 4 MG PO Q8, TAB


   2/8/19


Tramadol Hcl* (Ultram*) 50 Mg Tablet, 50 MG PO Q12H PRN for PAIN, TAB


   2/8/19


Metformin Hcl* (Metformin Hcl*) 500 Mg Tablet, 500 MG PO WITH BREAKFAST DINNE, 


#60 TAB


   2/8/19


Atorvastatin* (Atorvastatin*) 80 Mg Tablet, 80 MG PO QHS, #30 TAB


   2/8/19


Medications





Current Medications


Sodium Chloride 1,000 ml @  100 mls/hr Q10H IV  Last administered on 2/9/19at 


08:14; Admin Dose 100 MLS/HR;  Start 2/8/19 at 21:30


IV Flush (NS 3 ml) 3 ml PER PROTOCOL IV ;  Start 2/8/19 at 21:30


Ondansetron HCl (Zofran Inj) 4 mg Q6H  PRN IV NAUSEA/VOMITING Last administered 


on 2/8/19at 23:38; Admin Dose 4 MG;  Start 2/8/19 at 21:30


Hydromorphone HCl (Dilaudid) 0.5 mg Q4H  PRN IV .SEVERE PAIN 7-10;  Start 2/8/19


at 21:30


Docusate Sodium (Colace) 100 mg Q12H  PRN PO .CONSTIPATION;  Start 2/8/19 at 


21:30


Bisacodyl (Dulcolax) 5 mg DAILY  PRN PO .CONSTIPATION;  Start 2/8/19 at 21:30


Atorvastatin Calcium (Lipitor) 80 mg QHS PO  Last administered on 2/8/19at 


22:23; Admin Dose 80 MG;  Start 2/8/19 at 22:00


Ceftriaxone Sodium 50 ml @  100 mls/hr Q24H IVPB  Last administered on 2/9/19at 


05:25; Admin Dose 100 MLS/HR;  Start 2/9/19 at 02:30


Insulin Glargine (Lantus) 9 units DAILY@2000 SC ;  Start 2/9/19 at 20:00


Insulin Aspart (Novolog Insulin Pen) (Adult SC Insulin - Mild Algorithm)... Q4 


SC  Last administered on 2/9/19at 15:20; Admin Dose 1 UNIT;  Start 2/9/19 at 


13:00


Miscellaneous Information 1 ea NOTE XX ;  Start 2/9/19 at 10:30


Glucose (Glutose) 15 gm Q15M  PRN PO DECREASED GLUCOSE;  Start 2/9/19 at 10:30


Glucose (Glutose) 22.5 gm Q15M  PRN PO DECREASED GLUCOSE;  Start 2/9/19 at 10:30


Dextrose (D50w Syringe) 25 ml Q15M  PRN IV DECREASED GLUCOSE;  Start 2/9/19 at 


10:30


Dextrose (D50w Syringe) 50 ml Q15M  PRN IV DECREASED GLUCOSE;  Start 2/9/19 at 


10:30


Glucagon (Glucagen) 1 mg Q15M  PRN IM DECREASED GLUCOSE;  Start 2/9/19 at 10:30


Glucose (Glutose) 15 gm Q15M  PRN BUCCAL DECREASED GLUCOSE;  Start 2/9/19 at 


10:30


Allergies:  


Coded Allergies:  


     No Known Allergy (Unverified , 2/8/19)





Past Surgical History


As above





Family History


Significant Family History:  no pertinent family hx





Social History


Alcohol Use:  none


Smoking Status:  Never smoker


Drug Use:  none





Exam/Review of Systems


Exam


Vitals





Vital Signs


  Date      Temp  Pulse  Resp  B/P (MAP)   Pulse Ox  O2          O2 Flow    FiO2


Time                                                 Delivery    Rate


    2/9/19  98.8           20      117/61        96  Room Air


     08:14                           (79)


    2/8/19           82


     23:30








Intake and Output





2/8/19 2/8/19 2/9/19





1515:00


23:00


07:00





IntakeIntake Total


700 ml





BalanceBalance


700 ml











Constitutional:  alert, oriented


Psych:  nl mood/affect, anxiety (Minimal)


Head:  normocephalic, atraumatic


Eyes:  nl conjunctiva, EOMI, nl lids, nl sclera


ENMT:  nl external ears & nose, nl lips & teeth, mucosa pink and moist


Neck:  supple, non-tender


Respiratory:  normal air movement; 


   No congested cough


Cardiovascular:  regular rate and rhythm, nl pulses


Gastrointestinal:  soft, tender (Generalized); 


   No distended, No firm


Musculoskeletal:  nl extremities to inspection, nl gait and stance


Extremities:  normal pulses; 


   No edema


Neurological:  nl mental status, nl speech, nl strength


Skin:  nl turgor; 


   No rash or lesions


Lymph:  nl lymph nodes





Results


Result Diagram:  


2/9/19 0450 2/9/19 0450





Results 24hrs





Laboratory Tests


Test
                     2/8/19
20:07  2/9/19
04:49  2/9/19
04:50  2/9/19
15:18


White Blood Count                8.4                        6.1  #


Red Blood Count                 4.49                       3.71  L


Hemoglobin                      13.2                       10.9  L


Hematocrit                      40.0                       33.2  L


Mean Corpuscular Volume         89.1                        89.5


Mean Corpuscular                29.4                        29.4


Hemoglobin


Mean Corpuscular               33.0  
  
                  32.8  
  



Hemoglobin
Concent


Red Cell Distribution           12.5                        12.3


Width


Platelet Count                  489  H                      425  H


Mean Platelet Volume            10.3                       10.7  H


Immature Granulocytes %        0.400                       0.200


Neutrophils %                   52.9                        55.5


Lymphocytes %                   38.2                        35.1


Monocytes %                      7.1                         7.8


Eosinophils %                    0.8                         0.7


Basophils %                      0.6                         0.7


Nucleated Red Blood              0.0                         0.0


Cells %


Immature Granulocytes #        0.030                       0.010


Neutrophils #                    4.5                         3.4


Lymphocytes #                   3.2  H                       2.1


Monocytes #                      0.6                         0.5


Eosinophils #                    0.1                         0.0


Basophils #                      0.1                         0.0


Nucleated Red Blood              0.0                         0.0


Cells #


Urine Color               YELLOW


Urine Clarity             CLEAR


Urine pH                         6.0


Urine Specific Gravity         1.021


Urine Ketones             NEGATIVE


Urine Nitrite             NEGATIVE


Urine Bilirubin           NEGATIVE


Urine Urobilinogen        NEGATIVE


Urine Leukocyte Esterase         2+  H


Urine Microscopic RBC              3


Urine Microscopic WBC            38  H


Urine Squamous            FEW  
        
             
             



Epithelial
Cells


Urine Mucus               MODERATE


Urine Hemoglobin          NEGATIVE


Urine Glucose                    3+  H


Urine Total Protein       NEGATIVE


Sodium Level                    133  L                      134  L


Potassium Level                  4.4                         4.7


Chloride Level                   94  L                       100


Carbon Dioxide Level              29                          27


Anion Gap                         10                           7


Blood Urea Nitrogen               12                           9


Creatinine                      0.45                       0.38  L


Est Glomerular Filtrat    > 60  
       
             > 60  
       



Rate
mL/min


Glucose Level                   268  H                      299  H


Calcium Level                   10.0                         8.8


Total Bilirubin                  0.4                         0.4


Direct Bilirubin                0.00                        0.00


Indirect Bilirubin               0.4                         0.4


Aspartate Amino                  38  
  
                    31  
  



Transf
(AST/SGOT)


Alanine                          40  
  
                    34  
  



Aminotransferase
(ALT/SG


PT)


Alkaline Phosphatase            142  H                       110


Total Protein                   8.6  H                      6.3  #


Albumin                          4.3                       3.2  #L


Globulin                       4.30  H                      3.10


Albumin/Globulin Ratio          1.00                        1.03


Lipase                           236


Free Thyroxine                               2.12  H


Hemoglobin A1c                                             10.9  H


Magnesium Level                                              2.2


Triglycerides Level                                           96


Cholesterol Level                                            99  L


LDL Cholesterol,                                              60


Calculated


HDL Cholesterol                                              20  L


Cholesterol/HDL Ratio                                        4.9


Thyroid Stimulating       
             
                0.149  L
  



Hormone
(TSH)


Bedside Glucose                                                            162








Medications


Medication





Current Medications


Sodium Chloride 1,000 ml @  100 mls/hr Q10H IV  Last administered on 2/9/19at 


08:14; Admin Dose 100 MLS/HR;  Start 2/8/19 at 21:30


IV Flush (NS 3 ml) 3 ml PER PROTOCOL IV ;  Start 2/8/19 at 21:30


Ondansetron HCl (Zofran Inj) 4 mg Q6H  PRN IV NAUSEA/VOMITING Last administered 


on 2/8/19at 23:38; Admin Dose 4 MG;  Start 2/8/19 at 21:30


Hydromorphone HCl (Dilaudid) 0.5 mg Q4H  PRN IV .SEVERE PAIN 7-10;  Start 2/8/19


at 21:30


Docusate Sodium (Colace) 100 mg Q12H  PRN PO .CONSTIPATION;  Start 2/8/19 at 


21:30


Bisacodyl (Dulcolax) 5 mg DAILY  PRN PO .CONSTIPATION;  Start 2/8/19 at 21:30


Atorvastatin Calcium (Lipitor) 80 mg QHS PO  Last administered on 2/8/19at 


22:23; Admin Dose 80 MG;  Start 2/8/19 at 22:00


Ceftriaxone Sodium 50 ml @  100 mls/hr Q24H IVPB  Last administered on 2/9/19at 


05:25; Admin Dose 100 MLS/HR;  Start 2/9/19 at 02:30


Insulin Glargine (Lantus) 9 units DAILY@2000 SC ;  Start 2/9/19 at 20:00


Insulin Aspart (Novolog Insulin Pen) (Adult SC Insulin - Mild Algorithm)... Q4 


SC  Last administered on 2/9/19at 15:20; Admin Dose 1 UNIT;  Start 2/9/19 at 


13:00


Miscellaneous Information 1 ea NOTE XX ;  Start 2/9/19 at 10:30


Glucose (Glutose) 15 gm Q15M  PRN PO DECREASED GLUCOSE;  Start 2/9/19 at 10:30


Glucose (Glutose) 22.5 gm Q15M  PRN PO DECREASED GLUCOSE;  Start 2/9/19 at 10:30


Dextrose (D50w Syringe) 25 ml Q15M  PRN IV DECREASED GLUCOSE;  Start 2/9/19 at 


10:30


Dextrose (D50w Syringe) 50 ml Q15M  PRN IV DECREASED GLUCOSE;  Start 2/9/19 at 


10:30


Glucagon (Glucagen) 1 mg Q15M  PRN IM DECREASED GLUCOSE;  Start 2/9/19 at 10:30


Glucose (Glutose) 15 gm Q15M  PRN BUCCAL DECREASED GLUCOSE;  Start 2/9/19 at 


10:30











SHANT ARAGON NP        Feb 9, 2019 17:12

## 2019-02-09 NOTE — NUR
EOSS:

PATIENT ADMITTED WITH DX OF BILIARY COLIC. NOTED WITH NAUSEA AND VOMITING. ZOFRAN IV GIVEN 
AS ORDERED WITH RELIEF. PATIENT ON NPO STATUS IV FLUIDS INFUSING. PATIENT ABLE TO AMBULATE 
WITHOUT AD, STEADY. NEEDS MET & ATTENDED. WILL CONTINUE TO MONITOR.

## 2019-02-10 VITALS — SYSTOLIC BLOOD PRESSURE: 137 MMHG | RESPIRATION RATE: 18 BRPM | DIASTOLIC BLOOD PRESSURE: 66 MMHG | HEART RATE: 78 BPM

## 2019-02-10 VITALS — SYSTOLIC BLOOD PRESSURE: 116 MMHG | DIASTOLIC BLOOD PRESSURE: 65 MMHG | HEART RATE: 70 BPM | RESPIRATION RATE: 18 BRPM

## 2019-02-10 VITALS — HEART RATE: 68 BPM | SYSTOLIC BLOOD PRESSURE: 122 MMHG | DIASTOLIC BLOOD PRESSURE: 59 MMHG | RESPIRATION RATE: 14 BRPM

## 2019-02-10 VITALS — DIASTOLIC BLOOD PRESSURE: 73 MMHG | RESPIRATION RATE: 16 BRPM | SYSTOLIC BLOOD PRESSURE: 141 MMHG | HEART RATE: 70 BPM

## 2019-02-10 LAB
ADD MAN DIFF?: NO
ALANINE AMINOTRANSFERASE: 33 IU/L (ref 13–69)
ALBUMIN/GLOBULIN RATIO: 0.96
ALBUMIN: 3.2 G/DL (ref 3.3–4.9)
ALKALINE PHOSPHATASE: 103 IU/L (ref 42–121)
ANION GAP: 8 (ref 5–13)
ASPARTATE AMINO TRANSFERASE: 31 IU/L (ref 15–46)
BASOPHIL #: 0 10^3/UL (ref 0–0.1)
BASOPHILS %: 0.4 % (ref 0–2)
BILIRUBIN,DIRECT: 0 MG/DL (ref 0–0.2)
BILIRUBIN,TOTAL: 0.1 MG/DL (ref 0.2–1.3)
BLOOD UREA NITROGEN: 5 MG/DL (ref 7–20)
CALCIUM: 9.1 MG/DL (ref 8.4–10.2)
CARBON DIOXIDE: 25 MMOL/L (ref 21–31)
CHLORIDE: 105 MMOL/L (ref 97–110)
CREATININE: 0.44 MG/DL (ref 0.44–1)
EOSINOPHILS #: 0.1 10^3/UL (ref 0–0.5)
EOSINOPHILS %: 1.5 % (ref 0–7)
FREE T4 (FREE THYROXINE): 1.99 NG/DL (ref 0.64–1.79)
GLOBULIN: 3.3 G/DL (ref 1.3–3.2)
GLUCOSE: 165 MG/DL (ref 70–220)
HEMATOCRIT: 33.5 % (ref 37–47)
HEMOGLOBIN: 10.9 G/DL (ref 12–16)
IMMATURE GRANS #M: 0.04 10^3/UL (ref 0–0.03)
IMMATURE GRANS % (M): 0.6 % (ref 0–0.43)
LYMPHOCYTES #: 2.3 10^3/UL (ref 0.8–2.9)
LYMPHOCYTES %: 31.9 % (ref 15–51)
MAGNESIUM: 2.3 MG/DL (ref 1.7–2.5)
MEAN CORPUSCULAR HEMOGLOBIN: 29.5 PG (ref 29–33)
MEAN CORPUSCULAR HGB CONC: 32.5 G/DL (ref 32–37)
MEAN CORPUSCULAR VOLUME: 90.8 FL (ref 82–101)
MEAN PLATELET VOLUME: 10.5 FL (ref 7.4–10.4)
MONOCYTE #: 0.6 10^3/UL (ref 0.3–0.9)
MONOCYTES %: 8.6 % (ref 0–11)
NEUTROPHIL #: 4.1 10^3/UL (ref 1.6–7.5)
NEUTROPHILS %: 57 % (ref 39–77)
NUCLEATED RED BLOOD CELLS #: 0 10^3/UL (ref 0–0)
NUCLEATED RED BLOOD CELLS%: 0 /100WBC (ref 0–0)
PHOSPHORUS: 3.9 MG/DL (ref 2.5–4.9)
PLATELET COUNT: 413 10^3/UL (ref 140–415)
POTASSIUM: 3.7 MMOL/L (ref 3.5–5.1)
RED BLOOD COUNT: 3.69 10^6/UL (ref 4.2–5.4)
RED CELL DISTRIBUTION WIDTH: 12.5 % (ref 11.5–14.5)
SODIUM: 138 MMOL/L (ref 135–144)
THYROID STIMULATING HORMONE: 0.06 MIU/L (ref 0.47–4.68)
TOTAL PROTEIN: 6.5 G/DL (ref 6.1–8.1)
WHITE BLOOD COUNT: 7.2 10^3/UL (ref 4.8–10.8)

## 2019-02-10 RX ADMIN — CEFTRIAXONE SCH MLS/HR: 1 INJECTION, SOLUTION INTRAVENOUS at 02:33

## 2019-02-10 RX ADMIN — FOLIC ACID SCH MLS/HR: 5 INJECTION, SOLUTION INTRAMUSCULAR; INTRAVENOUS; SUBCUTANEOUS at 06:53

## 2019-02-10 RX ADMIN — INSULIN GLARGINE 1 UNITS: 100 INJECTION, SOLUTION SUBCUTANEOUS at 20:48

## 2019-02-10 RX ADMIN — ATORVASTATIN CALCIUM 1 MG: 80 TABLET, FILM COATED ORAL at 20:45

## 2019-02-10 RX ADMIN — THIAMINE HYDROCHLORIDE 1 MLS/HR: 100 INJECTION, SOLUTION INTRAMUSCULAR; INTRAVENOUS at 06:53

## 2019-02-10 RX ADMIN — HYDROMORPHONE HYDROCHLORIDE PRN MG: 1 INJECTION, SOLUTION INTRAMUSCULAR; INTRAVENOUS; SUBCUTANEOUS at 12:13

## 2019-02-10 RX ADMIN — INSULIN ASPART 1 UNIT: 100 INJECTION, SOLUTION INTRAVENOUS; SUBCUTANEOUS at 20:48

## 2019-02-10 RX ADMIN — ONDANSETRON HYDROCHLORIDE 1 MG: 2 INJECTION, SOLUTION INTRAMUSCULAR; INTRAVENOUS at 16:09

## 2019-02-10 RX ADMIN — THIAMINE HYDROCHLORIDE 1 MLS/HR: 100 INJECTION, SOLUTION INTRAMUSCULAR; INTRAVENOUS at 16:07

## 2019-02-10 RX ADMIN — FOLIC ACID SCH MLS/HR: 5 INJECTION, SOLUTION INTRAMUSCULAR; INTRAVENOUS; SUBCUTANEOUS at 02:48

## 2019-02-10 RX ADMIN — DEXAMETHASONE SODIUM PHOSPHATE PRN MG: 10 INJECTION, SOLUTION INTRAMUSCULAR; INTRAVENOUS at 16:09

## 2019-02-10 RX ADMIN — HYDROMORPHONE HYDROCHLORIDE PRN MG: 1 INJECTION, SOLUTION INTRAMUSCULAR; INTRAVENOUS; SUBCUTANEOUS at 16:06

## 2019-02-10 RX ADMIN — HYDROMORPHONE HYDROCHLORIDE 1 MG: 1 INJECTION, SOLUTION INTRAMUSCULAR; INTRAVENOUS; SUBCUTANEOUS at 12:13

## 2019-02-10 RX ADMIN — HYDROMORPHONE HYDROCHLORIDE 1 MG: 1 INJECTION, SOLUTION INTRAMUSCULAR; INTRAVENOUS; SUBCUTANEOUS at 16:06

## 2019-02-10 RX ADMIN — HYDROMORPHONE HYDROCHLORIDE 1 MG: 1 INJECTION, SOLUTION INTRAMUSCULAR; INTRAVENOUS; SUBCUTANEOUS at 06:57

## 2019-02-10 RX ADMIN — ATORVASTATIN CALCIUM SCH MG: 80 TABLET, FILM COATED ORAL at 20:45

## 2019-02-10 RX ADMIN — INSULIN ASPART 1 UNIT: 100 INJECTION, SOLUTION INTRAVENOUS; SUBCUTANEOUS at 08:28

## 2019-02-10 RX ADMIN — HYDROMORPHONE HYDROCHLORIDE PRN MG: 1 INJECTION, SOLUTION INTRAMUSCULAR; INTRAVENOUS; SUBCUTANEOUS at 06:57

## 2019-02-10 RX ADMIN — FOLIC ACID SCH MLS/HR: 5 INJECTION, SOLUTION INTRAMUSCULAR; INTRAVENOUS; SUBCUTANEOUS at 16:07

## 2019-02-10 RX ADMIN — METHIMAZOLE SCH MG: 5 TABLET ORAL at 14:16

## 2019-02-10 RX ADMIN — DEXAMETHASONE SODIUM PHOSPHATE PRN MG: 10 INJECTION, SOLUTION INTRAMUSCULAR; INTRAVENOUS at 06:57

## 2019-02-10 RX ADMIN — INSULIN ASPART 1 UNIT: 100 INJECTION, SOLUTION INTRAVENOUS; SUBCUTANEOUS at 12:17

## 2019-02-10 RX ADMIN — INSULIN ASPART 1 UNIT: 100 INJECTION, SOLUTION INTRAVENOUS; SUBCUTANEOUS at 17:55

## 2019-02-10 RX ADMIN — THIAMINE HYDROCHLORIDE 1 MLS/HR: 100 INJECTION, SOLUTION INTRAMUSCULAR; INTRAVENOUS at 02:48

## 2019-02-10 RX ADMIN — METHIMAZOLE 1 MG: 5 TABLET ORAL at 14:16

## 2019-02-10 RX ADMIN — INSULIN GLARGINE SCH UNITS: 100 INJECTION, SOLUTION SUBCUTANEOUS at 20:48

## 2019-02-10 RX ADMIN — CEFTRIAXONE 1 MLS/HR: 1 INJECTION, SOLUTION INTRAVENOUS at 02:33

## 2019-02-10 RX ADMIN — ONDANSETRON HYDROCHLORIDE 1 MG: 2 INJECTION, SOLUTION INTRAMUSCULAR; INTRAVENOUS at 06:57

## 2019-02-10 NOTE — PN
Date/Time of Note


Date/Time of Note


DATE: 2/10/19 


TIME: 15:55





Assessment/Plan


Lines/Catheters


IV Catheter Type (from Nrs):  Peripheral IV





Assessment/Plan


Chief Complaint/Hosp Course


1.  Symptomatic cholelithiasis:HIDA noted


-Lap mony> will schedule


2.  Abdominal pain: 2/2?  #1 versus other


-As above


-Pain management


3.Thrombocytosis:


-Trend


-Further workup if persistent


4.  Normocytic normochromic anemia:


-Monitor and transfuse as needed


5.  Hyponatremia:


-Judicious fluid correction


6.  Controlled diabetes:


-Glucose control


7.  Hyperthyroidism:


-Med management


8.  Hypoalbuminemia:


-Eventual nutrition optimization


9.  Pyuria


-Frequent bladder emptying





Thank you.  Patient seen and examined in collaboration with Dr. Dawit Styles.





Subjective


24 Hr Interval Summary


Continues to have abdominal pain.  No fevers, chills, sob, congested cough, cp, 


palpitations, ha, dizziness, nausea, vomiting, diarrhea, dysuria.





Exam/Review of Systems


Vital Signs


Vitals





Vital Signs


  Date      Temp  Pulse  Resp  B/P (MAP)   Pulse Ox  O2          O2 Flow    FiO2


Time                                                 Delivery    Rate


   2/10/19  97.9     70    16      141/73        99  Room Air


     14:52                           (95)








Intake and Output





2/9/19


2/9/19


2/10/19





1515:00


23:00


07:00





IntakeIntake Total


1900 ml


850 ml





BalanceBalance


1900 ml


850 ml














Exam


Free Text/Dictation


Constitutional:  alert, oriented


Psych:  nl mood/affect, anxiety (Minimal)


Head:  normocephalic, atraumatic


Eyes:  nl conjunctiva, EOMI, nl lids, nl sclera


ENMT:  nl external ears & nose, nl lips & teeth, mucosa pink and moist


Neck:  supple, non-tender


Respiratory:  normal air movement; 


   No congested cough


Cardiovascular:  regular rate and rhythm, nl pulses


Gastrointestinal:  soft, tender (Generalized); 


   No distended, No firm


Musculoskeletal:  nl extremities to inspection, nl gait and stance


Extremities:  normal pulses; 


   No edema


Neurological:  nl mental status, nl speech, nl strength


Skin:  nl turgor; 


   No rash or lesions


Lymph:  nl lymph nodes





Results


Result Diagram:  


2/10/19 0435                                                                    


           2/10/19 0435














SHANT ARAGON NP       Feb 10, 2019 15:57

## 2019-02-10 NOTE — NUR
NRSG NOTE:

PT IN BED, ASLEEP, EASILY AROUSED. AXO4. NO ACUTE DISTRESS NOTED. NO SOB. VSS. PAIN MGMT 
EFFECTIVE. NO FURTHER EPISODES OF N/V DURING SHIFT. ON CLEAR LIQUID DIET, CONTINUE PLAN TO 
ADVANCE DIET AFTER BREAKFAST. PT RESTING COMFORTABLY.

## 2019-02-10 NOTE — PN
Date/Time of Note


Date/Time of Note


DATE: 2/10/19 


TIME: 09:10





Assessment/Plan


VTE Prophylaxis


Risk score (from Nsg)>0 risk:  1


SCD applied (from Nsg):  Yes


Pharmacological prophylaxis:  NA/contraindicated


Pharm contraindication:  low risk/ambulating





Lines/Catheters


IV Catheter Type (from Nrsg):  Peripheral IV





Assessment/Plan


Hospital Course


SUBJECTIVE:


Continues to have postprandial abdominal pain.  Denies any nausea vomiting.





OBJECTIVE:


Physical Exam


General: Adequately build 58 year-old female lying in bed in no apparent 


distress.


HEENT: Normocephalic, atraumatic. Eyes: Anicteric sclerae, conjunctivae clear. 


ENT: Nasal septum midline, oral mucosa moist. Neck supple.


Respiratory: Bilaterally clear breath sounds. No use of accessory muscles of 


respiration. No adventitious breath sounds.


Cardiovascular: S1, S2 heard.  Regular rate and rhythm.


Abdomen: Soft and nondistended.  Right upper and lower quadrant tenderness.  


Bowel sounds positive in all 4 quadrants.


Genitourinary: Deferred.


Extremities: No cyanosis, no clubbing, no edema. Peripheral pulses palpable.


Neurologic: Cranial nerves II through XII grossly intact. The patient is awake, 


alert, and oriented.


Skin: Normal skin turgor. No skin rashes.





Labs & Vitals per chart





ASSESSMENT & PLAN





This is a 58-year-old female with past medical history of diabetes mellitus and 


history of ovarian tumor status post removal.  The patient has been to multiple 


hospitals recently because of abdominal pain.  The patient came to [facility] emergency


room because of continuous right upper quadrant abdominal pain with associated 


nausea and diarrhea.  There was no reported fevers or chills.  In the emergency 


room, the patient underwent a gallbladder ultrasound that was showing 


cholelithiasis with hepatic steatosis.  CT scan of the abdomen and pelvis also 


showed cholelithiasis without CT evidence of acute cholecystitis.  The patient 


was admitted to inpatient setting for further treatment and evaluation.





1.  Symptomatic cholelithiasis.


-Continue pain control


-Continue IV fluids.


-HIDA showing gallbladder visualization at 25 minutes.


-Started on clear liquid diet.  Continues to have postprandial pain.


-General surgery following.  





2.  Positive urinalysis.


-On empiric antibiotics.


-Await final cultures.





3.  Diabetes mellitus.


-Hemoglobin A1c 10.9.


-Continue  the patient on sliding scale insulin along with basal insulin.





4. Hyperthyroidism.


-?New onset.


-Obtain endocrinology consult.





5.  Fluids, electrolytes, and nutrition.


-Clear liquid diet.  


-IV fluids.





6.DVT prophylaxis.


-Bilateral SCDs.





7.  Plan.


-Continue pain control.


-Await further surgical recommendations.


-Endocrinology consult.





The patient was seen in collaboration with Dr. Prieto.


Result Diagram:  


2/10/19 0435                                                                    


           2/10/19 0435





Results 24hrs





Laboratory Tests


Test
                   2/9/19
15:18   2/9/19
18:06  2/9/19
20:07  2/10/19
02:33


Bedside Glucose                162            166          235  H          153


Test
                  2/10/19
04:35  2/10/19
08:26  
             



White Blood Count              7.2


Red Blood Count              3.69  L


Hemoglobin                   10.9  L


Hematocrit                   33.5  L


Mean Corpuscular              90.8


Volume


Mean Corpuscular              29.5


Hemoglobin


Mean Corpuscular             32.5  
  
              
             



Hemoglobin
Concent


Red Cell Distribution         12.5


Width


Platelet Count                 413


Mean Platelet Volume         10.5  H


Immature Granulocytes       0.600  H


%


Neutrophils %                 57.0


Lymphocytes %                 31.9


Monocytes %                    8.6


Eosinophils %                  1.5


Basophils %                    0.4


Nucleated Red Blood            0.0


Cells %


Immature Granulocytes       0.040  H


#


Neutrophils #                  4.1


Lymphocytes #                  2.3


Monocytes #                    0.6


Eosinophils #                  0.1


Basophils #                    0.0


Nucleated Red Blood            0.0


Cells #


Sodium Level                   138


Potassium Level                3.7


Chloride Level                 105


Carbon Dioxide Level            25


Anion Gap                        8


Blood Urea Nitrogen             5  L


Creatinine                    0.44


Est Glomerular         > 60  
        
              
             



Filtrat Rate
mL/min


Glucose Level                 165  #


Calcium Level                  9.1


Phosphorus Level               3.9


Magnesium Level                2.3


Total Bilirubin               0.1  L


Direct Bilirubin              0.00


Indirect Bilirubin             0.1


Aspartate Amino                31  
  
              
             



Transf
(AST/SGOT)


Alanine                        33  
  
              
             



Aminotransferase
(ALT


/SGPT)


Alkaline Phosphatase           103


Total Protein                  6.5


Albumin                       3.2  L


Globulin                     3.30  H


Albumin/Globulin              0.96


Ratio


Bedside Glucose                               165








Exam/Review of Systems


Exam


Vitals





Vital Signs


  Date      Temp  Pulse  Resp  B/P (MAP)   Pulse Ox  O2          O2 Flow    FiO2


Time                                                 Delivery    Rate


   2/10/19  98.0     68    14      122/59       100  Room Air


     07:16                           (80)








Intake and Output





2/9/19


2/9/19


2/10/19





1515:00


23:00


07:00





IntakeIntake Total


1900 ml


850 ml





BalanceBalance


1900 ml


850 ml














Results


Results 24hrs





Laboratory Tests


Test
                   2/9/19
15:18   2/9/19
18:06  2/9/19
20:07  2/10/19
02:33


Bedside Glucose                162            166          235  H          153


Test
                  2/10/19
04:35  2/10/19
08:26  
             



White Blood Count              7.2


Red Blood Count              3.69  L


Hemoglobin                   10.9  L


Hematocrit                   33.5  L


Mean Corpuscular              90.8


Volume


Mean Corpuscular              29.5


Hemoglobin


Mean Corpuscular             32.5  
  
              
             



Hemoglobin
Concent


Red Cell Distribution         12.5


Width


Platelet Count                 413


Mean Platelet Volume         10.5  H


Immature Granulocytes       0.600  H


%


Neutrophils %                 57.0


Lymphocytes %                 31.9


Monocytes %                    8.6


Eosinophils %                  1.5


Basophils %                    0.4


Nucleated Red Blood            0.0


Cells %


Immature Granulocytes       0.040  H


#


Neutrophils #                  4.1


Lymphocytes #                  2.3


Monocytes #                    0.6


Eosinophils #                  0.1


Basophils #                    0.0


Nucleated Red Blood            0.0


Cells #


Sodium Level                   138


Potassium Level                3.7


Chloride Level                 105


Carbon Dioxide Level            25


Anion Gap                        8


Blood Urea Nitrogen             5  L


Creatinine                    0.44


Est Glomerular         > 60  
        
              
             



Filtrat Rate
mL/min


Glucose Level                 165  #


Calcium Level                  9.1


Phosphorus Level               3.9


Magnesium Level                2.3


Total Bilirubin               0.1  L


Direct Bilirubin              0.00


Indirect Bilirubin             0.1


Aspartate Amino                31  
  
              
             



Transf
(AST/SGOT)


Alanine                        33  
  
              
             



Aminotransferase
(ALT


/SGPT)


Alkaline Phosphatase           103


Total Protein                  6.5


Albumin                       3.2  L


Globulin                     3.30  H


Albumin/Globulin              0.96


Ratio


Bedside Glucose                               165








Medications


Medication





Current Medications


Sodium Chloride 1,000 ml @  100 mls/hr Q10H IV  Last administered on 2/10/19at 


06:53; Admin Dose 100 MLS/HR;  Start 2/8/19 at 21:30


IV Flush (NS 3 ml) 3 ml PER PROTOCOL IV ;  Start 2/8/19 at 21:30


Ondansetron HCl (Zofran Inj) 4 mg Q6H  PRN IV NAUSEA/VOMITING Last administered 


on 2/10/19at 06:57; Admin Dose 4 MG;  Start 2/8/19 at 21:30


Hydromorphone HCl (Dilaudid) 0.5 mg Q4H  PRN IV .SEVERE PAIN 7-10 Last 


administered on 2/10/19at 06:57; Admin Dose 0.5 MG;  Start 2/8/19 at 21:30


Docusate Sodium (Colace) 100 mg Q12H  PRN PO .CONSTIPATION;  Start 2/8/19 at 


21:30


Bisacodyl (Dulcolax) 5 mg DAILY  PRN PO .CONSTIPATION;  Start 2/8/19 at 21:30


Atorvastatin Calcium (Lipitor) 80 mg QHS PO  Last administered on 2/9/19at 


20:16; Admin Dose 80 MG;  Start 2/8/19 at 22:00


Ceftriaxone Sodium 50 ml @  100 mls/hr Q24H IVPB  Last administered on 2/10/19at


02:33; Admin Dose 100 MLS/HR;  Start 2/9/19 at 02:30


Insulin Glargine (Lantus) 9 units DAILY@2000 SC  Last administered on 2/9/19at 


20:16; Admin Dose 9 UNITS;  Start 2/9/19 at 20:00


Miscellaneous Information 1 ea NOTE XX ;  Start 2/9/19 at 10:30


Glucose (Glutose) 15 gm Q15M  PRN PO DECREASED GLUCOSE;  Start 2/9/19 at 10:30


Glucose (Glutose) 22.5 gm Q15M  PRN PO DECREASED GLUCOSE;  Start 2/9/19 at 10:30


Dextrose (D50w Syringe) 25 ml Q15M  PRN IV DECREASED GLUCOSE;  Start 2/9/19 at 


10:30


Dextrose (D50w Syringe) 50 ml Q15M  PRN IV DECREASED GLUCOSE;  Start 2/9/19 at 


10:30


Glucagon (Glucagen) 1 mg Q15M  PRN IM DECREASED GLUCOSE;  Start 2/9/19 at 10:30


Glucose (Glutose) 15 gm Q15M  PRN BUCCAL DECREASED GLUCOSE;  Start 2/9/19 at 


10:30


Diagnostic Test (Pha) (Accu-Chek) 1 ea 02 XX  Last administered on 2/10/19at 


02:33; Admin Dose 1 EA;  Start 2/10/19 at 02:00


Insulin Aspart (Novolog Insulin Pen) NOVOLOG *MILD* ALGORITHM WITH MEALS  


BEDTIME SC  Last administered on 2/10/19at 08:28; Admin Dose 1 UNIT;  Start 


2/9/19 at 17:55











IKE JOHNSON NP               Feb 10, 2019 09:12

## 2019-02-10 NOTE — NUR
Nutrition Notes:

Tajik speaker. Continues w/abd pain per nursing. Started clear liquid diet at dinner 
yesterday, 50% PO. Plan to advance diet noted by nursing this am. Pt w/Hgb A1C 10.9. Visited 
pt's room, family at bedside and helped translate. Offered pt education on DM, pt receptive. 
Discussed foods to eat for DM diet and gave a h/o w/a visual of a how a meal should be 
portioned. Pt had no questions at this time. 





RD Recommendation

1. Would recommend to advance diet to 1500 azeb CHO control, soft texture.

## 2019-02-10 NOTE — NUR
PT RECEIVED IN BED. A,A,OX4. NOTIN ANY ACUTE DISTRESS. DENIES ANY PAIN AT THIS TIME. THE 
PLAN OF CARE DISCUSSED W/ THE PT, VERBALIZED UNDERSTANDING. CALL LIGHT IN REACH. PT 
ENCOURAGED TO CALL FOR ASSISTANCE. WILL CONT. MONITORING. WILL CONT. W/ THE PLAN OF CARE.

## 2019-02-10 NOTE — CONS
Assessment/Plan


Assessment/Plan


Hospital Course (Demo Recall)


New onset hyperthyroidism


-mildly hyperthyroid


-will check TSH receptor antibody


-start methimazole 10mg po daily


-side effects of methimazole including rash, liver injury and suppression of WBC


explained to patient. 


-will follow with you





Consultation Date/Type/Reason


Admit Date/Time





Date/Time of Note


DATE: 2/10/19 


TIME: 13:12





Hx of Present Illness


58-year-old female with type 2 DM, cholelithiasis presented with complaints of 1


month of right-sided abdominal pain radiating to the right flank, she is 


currently admitted for symptomatic cholelithiasis. Blood test here showed TFT 


consistent with mild hyperthyroidism and Endocrine service has been consulted 


for such. She denied any prior history of thyroid disorder. She denies any 


fevers or chills. She has menopause about 10 years ago, new onset diarrhea prior


to admission but baseline no change in bowel movement. She's lost about 20 lbs 


unintentionally in the past month. She stated occasional palpitation when she 


coughs.


Constitutional:  No fever, No chills, No sweats. 





Eye:  No discharge. No icterus





ENMT: No decreased hearing, no ear pain





Genitourinary:  No dysuria.





Respiratory:  No shortness of breath, cough or sputum production





Cardiovascular:  No chest pain, No palpitations. 





Gastrointestinal: Abdominal pain, nausea and diarrhea





Integumentary: No rash, No pruritus





Neurologic:  Alert and oriented, No confusion. 





Psychiatric:  Not delusional. No anxiety





Past Medical History


Medical History:  diabetes, gallstones


Home Meds


Reported Medications


Ondansetron Hcl* (Zofran*) 4 Mg Tablet, 4 MG PO Q8, TAB


   2/8/19


Tramadol Hcl* (Ultram*) 50 Mg Tablet, 50 MG PO Q12H PRN for PAIN, TAB


   2/8/19


Metformin Hcl* (Metformin Hcl*) 500 Mg Tablet, 500 MG PO WITH BREAKFAST DINNE, 


#60 TAB


   2/8/19


Atorvastatin* (Atorvastatin*) 80 Mg Tablet, 80 MG PO QHS, #30 TAB


   2/8/19


Medications





Current Medications


Sodium Chloride 1,000 ml @  100 mls/hr Q10H IV  Last administered on 2/10/19at 


06:53; Admin Dose 100 MLS/HR;  Start 2/8/19 at 21:30


IV Flush (NS 3 ml) 3 ml PER PROTOCOL IV ;  Start 2/8/19 at 21:30


Ondansetron HCl (Zofran Inj) 4 mg Q6H  PRN IV NAUSEA/VOMITING Last administered 


on 2/10/19at 06:57; Admin Dose 4 MG;  Start 2/8/19 at 21:30


Hydromorphone HCl (Dilaudid) 0.5 mg Q4H  PRN IV .SEVERE PAIN 7-10 Last 


administered on 2/10/19at 12:13; Admin Dose 0.5 MG;  Start 2/8/19 at 21:30


Docusate Sodium (Colace) 100 mg Q12H  PRN PO .CONSTIPATION;  Start 2/8/19 at 


21:30


Bisacodyl (Dulcolax) 5 mg DAILY  PRN PO .CONSTIPATION;  Start 2/8/19 at 21:30


Atorvastatin Calcium (Lipitor) 80 mg QHS PO  Last administered on 2/9/19at 


20:16; Admin Dose 80 MG;  Start 2/8/19 at 22:00


Ceftriaxone Sodium 50 ml @  100 mls/hr Q24H IVPB  Last administered on 2/10/19at


02:33; Admin Dose 100 MLS/HR;  Start 2/9/19 at 02:30


Insulin Glargine (Lantus) 9 units DAILY@2000 SC  Last administered on 2/9/19at 


20:16; Admin Dose 9 UNITS;  Start 2/9/19 at 20:00


Miscellaneous Information 1 ea NOTE XX ;  Start 2/9/19 at 10:30


Glucose (Glutose) 15 gm Q15M  PRN PO DECREASED GLUCOSE;  Start 2/9/19 at 10:30


Glucose (Glutose) 22.5 gm Q15M  PRN PO DECREASED GLUCOSE;  Start 2/9/19 at 10:30


Dextrose (D50w Syringe) 25 ml Q15M  PRN IV DECREASED GLUCOSE;  Start 2/9/19 at 


10:30


Dextrose (D50w Syringe) 50 ml Q15M  PRN IV DECREASED GLUCOSE;  Start 2/9/19 at 


10:30


Glucagon (Glucagen) 1 mg Q15M  PRN IM DECREASED GLUCOSE;  Start 2/9/19 at 10:30


Glucose (Glutose) 15 gm Q15M  PRN BUCCAL DECREASED GLUCOSE;  Start 2/9/19 at 


10:30


Diagnostic Test (Pha) (Accu-Chek) 1 ea 02 XX  Last administered on 2/10/19at 


02:33; Admin Dose 1 EA;  Start 2/10/19 at 02:00


Insulin Aspart (Novolog Insulin Pen) NOVOLOG *MILD* ALGORITHM WITH MEALS  


BEDTIME SC  Last administered on 2/10/19at 12:17; Admin Dose 1 UNIT;  Start 


2/9/19 at 17:55


Allergies:  


Coded Allergies:  


     No Known Allergy (Unverified , 2/8/19)





Past Surgical History


Past Surgical Hx:  other (oopherectomy (unsure which side))





Family History


Significant Family History:  other (no family history of thyroid disorder)





Social History


Alcohol Use:  none


Smoking Status:  Never smoker


Drug Use:  none





Exam/Review of Systems


Exam


Vitals





Vital Signs


  Date      Temp  Pulse  Resp  B/P (MAP)   Pulse Ox  O2          O2 Flow    FiO2


Time                                                 Delivery    Rate


   2/10/19  98.0     68    14      122/59       100  Room Air


     07:16                           (80)








Intake and Output





2/9/19


2/9/19


2/10/19





1515:00


23:00


07:00





IntakeIntake Total


1900 ml


850 ml





BalanceBalance


1900 ml


850 ml











Exam


General:  Comfortable in appearance, not in acute distress.  Skin appropriate 


for ethnicity





Eye:  Extraocular movements are intact, Normal conjunctiva. No exothalmos or lid


lag





HENT:  Normocephalic, atraumatic. No thyromegaly





Respiratory:  Respirations are non-labored, Breath sounds are equal, Symmetrical


chest wall expansion. 





Cardiovascular:  S1, S2. No murmur. No LE edema





Gastrointestinal:  Soft, Non-tender, Non-distended, Normal bowel sounds. 





Integumentary:  Warm to touch.





Neurologic:  Alert, Oriented. No hand tremor 





Cognition and Speech:  Speech clear and coherent, Functional cognition intact. 





Psychiatric:  Cooperative, Appropriate mood & affect.





Results


Result Diagram:  


2/10/19 0435                                                                    


           2/10/19 0435





Results 24hrs





Laboratory Tests


Test
                  2/9/19
15:18   2/9/19
18:06   2/9/19
20:07  2/10/19
02:33


Bedside Glucose               162            166           235  H          153


Test
                 2/10/19
04:35  2/10/19
08:26  2/10/19
12:16  



White Blood Count             7.2


Red Blood Count             3.69  L


Hemoglobin                  10.9  L


Hematocrit                  33.5  L


Mean Corpuscular             90.8


Volume


Mean Corpuscular             29.5


Hemoglobin


Mean Corpuscular            32.5  
  
              
              



Hemoglobin
Concent


Red Cell                     12.5


Distribution Width


Platelet Count                413


Mean Platelet Volume        10.5  H


Immature                   0.600  H


Granulocytes %


Neutrophils %                57.0


Lymphocytes %                31.9


Monocytes %                   8.6


Eosinophils %                 1.5


Basophils %                   0.4


Nucleated Red Blood           0.0


Cells %


Immature                   0.040  H


Granulocytes #


Neutrophils #                 4.1


Lymphocytes #                 2.3


Monocytes #                   0.6


Eosinophils #                 0.1


Basophils #                   0.0


Nucleated Red Blood           0.0


Cells #


Sodium Level                  138


Potassium Level               3.7


Chloride Level                105


Carbon Dioxide Level           25


Anion Gap                       8


Blood Urea Nitrogen            5  L


Creatinine                   0.44


Est Glomerular        > 60  
        
              
              



Filtrat Rate
mL/min


Glucose Level                165  #


Calcium Level                 9.1


Phosphorus Level              3.9


Magnesium Level               2.3


Total Bilirubin              0.1  L


Direct Bilirubin             0.00


Indirect Bilirubin            0.1


Aspartate Amino               31  
  
              
              



Transf
(AST/SGOT)


Alanine                       33  
  
              
              



Aminotransferase
(AL


T/SGPT)


Alkaline Phosphatase          103


Total Protein                 6.5


Albumin                      3.2  L


Globulin                    3.30  H


Albumin/Globulin             0.96


Ratio


Thyroid Stimulating       0.057  L
  
              
              



Hormone
(TSH)


Free Thyroxine              1.99  H


Bedside Glucose                              165            177








Medications


Medication





Current Medications


Sodium Chloride 1,000 ml @  100 mls/hr Q10H IV  Last administered on 2/10/19at 


06:53; Admin Dose 100 MLS/HR;  Start 2/8/19 at 21:30


IV Flush (NS 3 ml) 3 ml PER PROTOCOL IV ;  Start 2/8/19 at 21:30


Ondansetron HCl (Zofran Inj) 4 mg Q6H  PRN IV NAUSEA/VOMITING Last administered 


on 2/10/19at 06:57; Admin Dose 4 MG;  Start 2/8/19 at 21:30


Hydromorphone HCl (Dilaudid) 0.5 mg Q4H  PRN IV .SEVERE PAIN 7-10 Last 


administered on 2/10/19at 12:13; Admin Dose 0.5 MG;  Start 2/8/19 at 21:30


Docusate Sodium (Colace) 100 mg Q12H  PRN PO .CONSTIPATION;  Start 2/8/19 at 


21:30


Bisacodyl (Dulcolax) 5 mg DAILY  PRN PO .CONSTIPATION;  Start 2/8/19 at 21:30


Atorvastatin Calcium (Lipitor) 80 mg QHS PO  Last administered on 2/9/19at 


20:16; Admin Dose 80 MG;  Start 2/8/19 at 22:00


Ceftriaxone Sodium 50 ml @  100 mls/hr Q24H IVPB  Last administered on 2/10/19at


02:33; Admin Dose 100 MLS/HR;  Start 2/9/19 at 02:30


Insulin Glargine (Lantus) 9 units DAILY@2000 SC  Last administered on 2/9/19at 


20:16; Admin Dose 9 UNITS;  Start 2/9/19 at 20:00


Miscellaneous Information 1 ea NOTE XX ;  Start 2/9/19 at 10:30


Glucose (Glutose) 15 gm Q15M  PRN PO DECREASED GLUCOSE;  Start 2/9/19 at 10:30


Glucose (Glutose) 22.5 gm Q15M  PRN PO DECREASED GLUCOSE;  Start 2/9/19 at 10:30


Dextrose (D50w Syringe) 25 ml Q15M  PRN IV DECREASED GLUCOSE;  Start 2/9/19 at 


10:30


Dextrose (D50w Syringe) 50 ml Q15M  PRN IV DECREASED GLUCOSE;  Start 2/9/19 at 


10:30


Glucagon (Glucagen) 1 mg Q15M  PRN IM DECREASED GLUCOSE;  Start 2/9/19 at 10:30


Glucose (Glutose) 15 gm Q15M  PRN BUCCAL DECREASED GLUCOSE;  Start 2/9/19 at 


10:30


Diagnostic Test (Pha) (Accu-Chek) 1 ea 02 XX  Last administered on 2/10/19at 


02:33; Admin Dose 1 EA;  Start 2/10/19 at 02:00


Insulin Aspart (Novolog Insulin Pen) NOVOLOG *MILD* ALGORITHM WITH MEALS  


BEDTIME SC  Last administered on 2/10/19at 12:17; Admin Dose 1 UNIT;  Start 


2/9/19 at 17:55











KILLIAN HUMMEL MD               Feb 10, 2019 13:29

## 2019-02-10 NOTE — NUR
EOSS: PT'S HEMODYNAMICS AND RESPIR. STATUS ARE STABLE. NO COMPLICATIONS. NO CHANGES IN 
CONDITION. PAIN MGMT IS EFFECTIVE. WILL CONT.MONITORING. WILL CONT. W/ THE PLAN OF CARE.

## 2019-02-11 VITALS — SYSTOLIC BLOOD PRESSURE: 126 MMHG | HEART RATE: 80 BPM | DIASTOLIC BLOOD PRESSURE: 68 MMHG | RESPIRATION RATE: 17 BRPM

## 2019-02-11 VITALS — SYSTOLIC BLOOD PRESSURE: 130 MMHG | DIASTOLIC BLOOD PRESSURE: 62 MMHG | HEART RATE: 78 BPM | RESPIRATION RATE: 18 BRPM

## 2019-02-11 VITALS — SYSTOLIC BLOOD PRESSURE: 112 MMHG | DIASTOLIC BLOOD PRESSURE: 56 MMHG | RESPIRATION RATE: 19 BRPM | HEART RATE: 76 BPM

## 2019-02-11 LAB
ADD MAN DIFF?: NO
ALANINE AMINOTRANSFERASE: 32 IU/L (ref 13–69)
ALBUMIN/GLOBULIN RATIO: 0.91
ALBUMIN: 3.3 G/DL (ref 3.3–4.9)
ALKALINE PHOSPHATASE: 94 IU/L (ref 42–121)
ANION GAP: 2 (ref 5–13)
ASPARTATE AMINO TRANSFERASE: 31 IU/L (ref 15–46)
BASOPHIL #: 0 10^3/UL (ref 0–0.1)
BASOPHILS %: 0.6 % (ref 0–2)
BILIRUBIN,DIRECT: 0 MG/DL (ref 0–0.2)
BILIRUBIN,TOTAL: 0.3 MG/DL (ref 0.2–1.3)
BLOOD UREA NITROGEN: 3 MG/DL (ref 7–20)
CALCIUM: 9.3 MG/DL (ref 8.4–10.2)
CARBON DIOXIDE: 28 MMOL/L (ref 21–31)
CHLORIDE: 110 MMOL/L (ref 97–110)
CREATININE: 0.43 MG/DL (ref 0.44–1)
EOSINOPHILS #: 0.1 10^3/UL (ref 0–0.5)
EOSINOPHILS %: 0.8 % (ref 0–7)
GLOBULIN: 3.6 G/DL (ref 1.3–3.2)
GLUCOSE: 139 MG/DL (ref 70–220)
HEMATOCRIT: 33.7 % (ref 37–47)
HEMOGLOBIN: 11.2 G/DL (ref 12–16)
IMMATURE GRANS #M: 0.02 10^3/UL (ref 0–0.03)
IMMATURE GRANS % (M): 0.3 % (ref 0–0.43)
LYMPHOCYTES #: 2.2 10^3/UL (ref 0.8–2.9)
LYMPHOCYTES %: 34 % (ref 15–51)
MAGNESIUM: 2.3 MG/DL (ref 1.7–2.5)
MEAN CORPUSCULAR HEMOGLOBIN: 29.9 PG (ref 29–33)
MEAN CORPUSCULAR HGB CONC: 33.2 G/DL (ref 32–37)
MEAN CORPUSCULAR VOLUME: 89.9 FL (ref 82–101)
MEAN PLATELET VOLUME: 10.5 FL (ref 7.4–10.4)
MONOCYTE #: 0.6 10^3/UL (ref 0.3–0.9)
MONOCYTES %: 8.6 % (ref 0–11)
NEUTROPHIL #: 3.5 10^3/UL (ref 1.6–7.5)
NEUTROPHILS %: 55.7 % (ref 39–77)
NUCLEATED RED BLOOD CELLS #: 0 10^3/UL (ref 0–0)
NUCLEATED RED BLOOD CELLS%: 0 /100WBC (ref 0–0)
PHOSPHORUS: 4 MG/DL (ref 2.5–4.9)
PLATELET COUNT: 440 10^3/UL (ref 140–415)
POTASSIUM: 3.7 MMOL/L (ref 3.5–5.1)
RED BLOOD COUNT: 3.75 10^6/UL (ref 4.2–5.4)
RED CELL DISTRIBUTION WIDTH: 12.3 % (ref 11.5–14.5)
SODIUM: 140 MMOL/L (ref 135–144)
TOTAL PROTEIN: 6.9 G/DL (ref 6.1–8.1)
WHITE BLOOD COUNT: 6.4 10^3/UL (ref 4.8–10.8)

## 2019-02-11 RX ADMIN — DEXAMETHASONE SODIUM PHOSPHATE PRN MG: 10 INJECTION, SOLUTION INTRAMUSCULAR; INTRAVENOUS at 21:15

## 2019-02-11 RX ADMIN — ATORVASTATIN CALCIUM 1 MG: 80 TABLET, FILM COATED ORAL at 21:15

## 2019-02-11 RX ADMIN — INSULIN ASPART 1 UNIT: 100 INJECTION, SOLUTION INTRAVENOUS; SUBCUTANEOUS at 09:14

## 2019-02-11 RX ADMIN — METHIMAZOLE SCH MG: 5 TABLET ORAL at 09:11

## 2019-02-11 RX ADMIN — INSULIN GLARGINE SCH UNITS: 100 INJECTION, SOLUTION SUBCUTANEOUS at 20:28

## 2019-02-11 RX ADMIN — INSULIN ASPART 1 UNIT: 100 INJECTION, SOLUTION INTRAVENOUS; SUBCUTANEOUS at 21:00

## 2019-02-11 RX ADMIN — METHIMAZOLE 1 MG: 5 TABLET ORAL at 09:11

## 2019-02-11 RX ADMIN — ONDANSETRON HYDROCHLORIDE 1 MG: 2 INJECTION, SOLUTION INTRAMUSCULAR; INTRAVENOUS at 21:15

## 2019-02-11 RX ADMIN — HYDROMORPHONE HYDROCHLORIDE 1 MG: 1 INJECTION, SOLUTION INTRAMUSCULAR; INTRAVENOUS; SUBCUTANEOUS at 20:26

## 2019-02-11 RX ADMIN — HYDROMORPHONE HYDROCHLORIDE PRN MG: 1 INJECTION, SOLUTION INTRAMUSCULAR; INTRAVENOUS; SUBCUTANEOUS at 14:16

## 2019-02-11 RX ADMIN — FOLIC ACID SCH MLS/HR: 5 INJECTION, SOLUTION INTRAMUSCULAR; INTRAVENOUS; SUBCUTANEOUS at 02:09

## 2019-02-11 RX ADMIN — HYDROMORPHONE HYDROCHLORIDE 1 MG: 1 INJECTION, SOLUTION INTRAMUSCULAR; INTRAVENOUS; SUBCUTANEOUS at 14:16

## 2019-02-11 RX ADMIN — FOLIC ACID SCH MLS/HR: 5 INJECTION, SOLUTION INTRAMUSCULAR; INTRAVENOUS; SUBCUTANEOUS at 16:22

## 2019-02-11 RX ADMIN — THIAMINE HYDROCHLORIDE 1 MLS/HR: 100 INJECTION, SOLUTION INTRAMUSCULAR; INTRAVENOUS at 02:09

## 2019-02-11 RX ADMIN — CEFTRIAXONE SCH MLS/HR: 1 INJECTION, SOLUTION INTRAVENOUS at 03:57

## 2019-02-11 RX ADMIN — INSULIN ASPART 1 UNIT: 100 INJECTION, SOLUTION INTRAVENOUS; SUBCUTANEOUS at 17:45

## 2019-02-11 RX ADMIN — ATORVASTATIN CALCIUM SCH MG: 80 TABLET, FILM COATED ORAL at 21:15

## 2019-02-11 RX ADMIN — DEXAMETHASONE SODIUM PHOSPHATE PRN MG: 10 INJECTION, SOLUTION INTRAMUSCULAR; INTRAVENOUS at 15:30

## 2019-02-11 RX ADMIN — ONDANSETRON HYDROCHLORIDE 1 MG: 2 INJECTION, SOLUTION INTRAMUSCULAR; INTRAVENOUS at 15:30

## 2019-02-11 RX ADMIN — CEFTRIAXONE 1 MLS/HR: 1 INJECTION, SOLUTION INTRAVENOUS at 03:57

## 2019-02-11 RX ADMIN — INSULIN GLARGINE 1 UNITS: 100 INJECTION, SOLUTION SUBCUTANEOUS at 20:28

## 2019-02-11 RX ADMIN — THIAMINE HYDROCHLORIDE 1 MLS/HR: 100 INJECTION, SOLUTION INTRAMUSCULAR; INTRAVENOUS at 16:22

## 2019-02-11 RX ADMIN — HYDROMORPHONE HYDROCHLORIDE PRN MG: 1 INJECTION, SOLUTION INTRAMUSCULAR; INTRAVENOUS; SUBCUTANEOUS at 20:26

## 2019-02-11 RX ADMIN — INSULIN ASPART 1 UNIT: 100 INJECTION, SOLUTION INTRAVENOUS; SUBCUTANEOUS at 12:40

## 2019-02-11 NOTE — NUR
END OF SHIFT NOTE:



PATIENT'S PAIN MANAGED WITH DILAUDID IVP ONCE IN THIS SHIFT, ZOFRAN GIVEN FOR NAUSEA. PT WAS 
ABLE TO AMBULATE TO USE TOILET WITH STANDBY ASSIST. PT TOLERATED CLEAR DIET WELL, RIGHT HAND 
IV INTACT AND PATENT, RECEIVING IVF AS ORDERED, INFUSING WELL. PT TO BE NPO AFTER MIDNIGHT 
AS PREPARATION FOR SURGERY TOMORROW. PT 'S SKIN REMAINS TO BE INTACT, INDEPENDENT IN BED 
MOBILITY, PLACED CALL LIGHT WITHIN REACH, BED IN LOW POSITION AND LOCKED IN PLACE.

## 2019-02-11 NOTE — PREAC
Date/Time of Note


Date/Time of Note


DATE: 2/11/19 


TIME: 12:08





Anesthesia Eval and Record


Evaluation


Time Pre-Procedure Interview


DATE: 2/11/19 


TIME: 12:08


Age


58


Sex


female


NPO:  8 hrs


Preoperative diagnosis


symptomatic cholelithiasis


Planned procedure


Laparoscopic cholecystectomy





Past Medical History


Past Medical History:  Includes


Cardio:  Dyslipidemia


Endo:  Diabetes





Surgery & Anesthesia Issues


No known issue





Meds


Anticoagulation:  No


Beta Blocker within 24 hr:  No


Reason Beta Blocker not given:  Pt. not on B-Blocker


Reported Medications


Ondansetron Hcl* (Zofran*) 4 Mg Tablet, 4 MG PO Q8, TAB


   2/8/19


Tramadol Hcl* (Ultram*) 50 Mg Tablet, 50 MG PO Q12H PRN for PAIN, TAB


   2/8/19


Metformin Hcl* (Metformin Hcl*) 500 Mg Tablet, 500 MG PO WITH BREAKFAST DINNE, 


#60 TAB


   2/8/19


Atorvastatin* (Atorvastatin*) 80 Mg Tablet, 80 MG PO QHS, #30 TAB


   2/8/19





Current Medications


Sodium Chloride 1,000 ml @  100 mls/hr Q10H IV  Last administered on 2/11/19at 


02:09; Admin Dose 100 MLS/HR;  Start 2/8/19 at 21:30


IV Flush (NS 3 ml) 3 ml PER PROTOCOL IV ;  Start 2/8/19 at 21:30


Ondansetron HCl (Zofran Inj) 4 mg Q6H  PRN IV NAUSEA/VOMITING Last administered 


on 2/10/19at 16:09; Admin Dose 4 MG;  Start 2/8/19 at 21:30


Hydromorphone HCl (Dilaudid) 0.5 mg Q4H  PRN IV .SEVERE PAIN 7-10 Last 


administered on 2/10/19at 16:06; Admin Dose 0.5 MG;  Start 2/8/19 at 21:30


Docusate Sodium (Colace) 100 mg Q12H  PRN PO .CONSTIPATION;  Start 2/8/19 at 


21:30


Bisacodyl (Dulcolax) 5 mg DAILY  PRN PO .CONSTIPATION;  Start 2/8/19 at 21:30


Atorvastatin Calcium (Lipitor) 80 mg QHS PO  Last administered on 2/10/19at 


20:45; Admin Dose 80 MG;  Start 2/8/19 at 22:00


Ceftriaxone Sodium 50 ml @  100 mls/hr Q24H IVPB  Last administered on 2/11/19at


03:57; Admin Dose 100 MLS/HR;  Start 2/9/19 at 02:30


Insulin Glargine (Lantus) 9 units DAILY@2000 SC  Last administered on 2/10/19at 


20:48; Admin Dose 9 UNITS;  Start 2/9/19 at 20:00


Miscellaneous Information 1 ea NOTE XX ;  Start 2/9/19 at 10:30


Glucose (Glutose) 15 gm Q15M  PRN PO DECREASED GLUCOSE;  Start 2/9/19 at 10:30


Glucose (Glutose) 22.5 gm Q15M  PRN PO DECREASED GLUCOSE;  Start 2/9/19 at 10:30


Dextrose (D50w Syringe) 25 ml Q15M  PRN IV DECREASED GLUCOSE;  Start 2/9/19 at 


10:30


Dextrose (D50w Syringe) 50 ml Q15M  PRN IV DECREASED GLUCOSE;  Start 2/9/19 at 


10:30


Glucagon (Glucagen) 1 mg Q15M  PRN IM DECREASED GLUCOSE;  Start 2/9/19 at 10:30


Glucose (Glutose) 15 gm Q15M  PRN BUCCAL DECREASED GLUCOSE;  Start 2/9/19 at 


10:30


Diagnostic Test (Pha) (Accu-Chek) 1 ea 02 XX  Last administered on 2/10/19at 


02:33; Admin Dose 1 EA;  Start 2/10/19 at 02:00


Insulin Aspart (Novolog Insulin Pen) NOVOLOG *MILD* ALGORITHM WITH MEALS  


BEDTIME SC  Last administered on 2/10/19at 12:17; Admin Dose 1 UNIT;  Start 


2/9/19 at 17:55


Methimazole (Tapazole) 10 mg DAILY PO  Last administered on 2/11/19at 09:11; 


Admin Dose 10 MG;  Start 2/10/19 at 13:30


Meds reviewed:  Yes





Allergies


Coded Allergies:  


     No Known Allergy (Unverified , 2/8/19)


Allergies Reviewed:  Yes





Labs/Studies


Labs Reviewed:  Reviewed by anesthesiologist


Result Diagram:  


2/11/19 0428                                                                    


           2/11/19 0428





Laboratory Tests


2/11/19 04:28








Pregnancy test:  N/A





Pre-procedure Exam


Last vitals





Vital Signs


  Date      Temp  Pulse  Resp  B/P (MAP)   Pulse Ox  O2          O2 Flow    FiO2


Time                                                 Delivery    Rate


   2/11/19  98.2     80    17      126/68        97


     07:50                           (87)


   2/10/19                                           Room Air


     14:52





Airway:  Adequate mouth opening


Mallampati:  Mallampati II


Teeth:  Normal


Lung:  Normal


Heart:  Normal





ASA Physical Status


ASA physical status:  2


Emergency:  None





Planned Anesthetic


General/MAC:  ETT





Pre-operative Attestations


Prior to commencing anesthesia and surgery, the patient was re-evaluated, there 


was verification of:


*The patient's identity


*The results of appropriate recent lab work and preoperative vital signs


*The above evaluation not changing prior to induction


*Anesthetic plan, risk benefits, alternative and complications discussed with 


patient/family; questions answered; patient/family understands, accepts and 


wishes to proceed.











ANNE-MARIE AUSTIN                  Feb 11, 2019 12:10

## 2019-02-11 NOTE — NUR
END OF SHIFT REPORTS: PATIENT RESTED THROUGHOUT THE NIGHT. NO COMPLAIN OF PAIN.WALKED TO THE 
BATHROOM. NPO POST MN, PATIENT IS AWARE. HEMODYNAMICS AND RESPIRATORY STATUS ARE STABLE.

## 2019-02-11 NOTE — PN
Date/Time of Note


Date/Time of Note


DATE: 2/11/19 


TIME: 16:25





Assessment/Plan


VTE Prophylaxis


Risk score (from Ns)>0 risk:  1


SCD applied (from [facility]):  No


SCD contraindicated:  patient refusal


Pharmacological prophylaxis:  LMWH


Pharm contraindication:  surgical contra





Lines/Catheters


IV Catheter Type (from [facility]):  Peripheral IV





Assessment/Plan


Hospital Course





Assessment and plan


1.  Symptomatic cholelithiasis, stable.  Patient for lap mony tomorrow


Low perioperative risk, may proceed forward to surgery from medical standpoint.


2.  Type 2 diabetes/metabolic syndrome


3.  Anemia stable


4.  Cystitis?  3-day therapy


5.  Subclinical hyperparathyroidism





Subjective: No events.  No fever vomiting





Objective: Vital signs stable





Physical exam


No pallor icterus


Regular


Clear


Bs dimin nt nd no RRG


No edema


Result Diagram:  


2/11/19 0428 2/11/19 0428





Results 24hrs





Laboratory Tests


Test
                 2/10/19
17:37  2/10/19
20:44  2/11/19
01:37  2/11/19
04:28


Bedside Glucose               130            164            124


White Blood Count                                                          6.4


Red Blood Count                                                          3.75  L


Hemoglobin                                                               11.2  L


Hematocrit                                                               33.7  L


Mean Corpuscular                                                          89.9


Volume


Mean Corpuscular                                                          29.9


Hemoglobin


Mean Corpuscular      
              
              
                    33.2  



Hemoglobin
Concent


Red Cell                                                                  12.3


Distribution Width


Platelet Count                                                            440  H


Mean Platelet Volume                                                     10.5  H


Immature                                                                 0.300


Granulocytes %


Neutrophils %                                                             55.7


Lymphocytes %                                                             34.0


Monocytes %                                                                8.6


Eosinophils %                                                              0.8


Basophils %                                                                0.6


Nucleated Red Blood                                                        0.0


Cells %


Immature                                                                 0.020


Granulocytes #


Neutrophils #                                                              3.5


Lymphocytes #                                                              2.2


Monocytes #                                                                0.6


Eosinophils #                                                              0.1


Basophils #                                                                0.0


Nucleated Red Blood                                                        0.0


Cells #


Sodium Level                                                               140


Potassium Level                                                            3.7


Chloride Level                                                             110


Carbon Dioxide Level                                                        28


Anion Gap                                                                   2  L


Blood Urea Nitrogen                                                         3  L


Creatinine                                                               0.43  L


Est Glomerular        
              
              
              > 60  



Filtrat Rate
mL/min


Glucose Level                                                              139


Calcium Level                                                              9.3


Phosphorus Level                                                           4.0


Magnesium Level                                                            2.3


Total Bilirubin                                                            0.3


Direct Bilirubin                                                          0.00


Indirect Bilirubin                                                         0.3


Aspartate Amino       
              
              
                      31  



Transf
(AST/SGOT)


Alanine               
              
              
                      32  



Aminotransferase
(AL


T/SGPT)


Alkaline Phosphatase                                                        94


Total Protein                                                              6.9


Albumin                                                                    3.3


Globulin                                                                 3.60  H


Albumin/Globulin                                                          0.91


Ratio


Test
                 2/11/19
05:18  2/11/19
09:12  2/11/19
12:38  



Bedside Glucose               121            127            167








Exam/Review of Systems


Exam


Vitals





Vital Signs


  Date      Temp  Pulse  Resp  B/P (MAP)   Pulse Ox  O2          O2 Flow    FiO2


Time                                                 Delivery    Rate


   2/11/19  98.0     78    18      130/62        94


     13:30                           (84)


   2/10/19                                           Room Air


     14:52








Intake and Output





2/10/19


2/10/19


2/11/19





1515:00


23:00


07:00





IntakeIntake Total


350 ml


1380 ml


1250 ml





BalanceBalance


350 ml


1380 ml


1250 ml














Results


Results 24hrs





Laboratory Tests


Test
                 2/10/19
17:37  2/10/19
20:44  2/11/19
01:37  2/11/19
04:28


Bedside Glucose               130            164            124


White Blood Count                                                          6.4


Red Blood Count                                                          3.75  L


Hemoglobin                                                               11.2  L


Hematocrit                                                               33.7  L


Mean Corpuscular                                                          89.9


Volume


Mean Corpuscular                                                          29.9


Hemoglobin


Mean Corpuscular      
              
              
                    33.2  



Hemoglobin
Concent


Red Cell                                                                  12.3


Distribution Width


Platelet Count                                                            440  H


Mean Platelet Volume                                                     10.5  H


Immature                                                                 0.300


Granulocytes %


Neutrophils %                                                             55.7


Lymphocytes %                                                             34.0


Monocytes %                                                                8.6


Eosinophils %                                                              0.8


Basophils %                                                                0.6


Nucleated Red Blood                                                        0.0


Cells %


Immature                                                                 0.020


Granulocytes #


Neutrophils #                                                              3.5


Lymphocytes #                                                              2.2


Monocytes #                                                                0.6


Eosinophils #                                                              0.1


Basophils #                                                                0.0


Nucleated Red Blood                                                        0.0


Cells #


Sodium Level                                                               140


Potassium Level                                                            3.7


Chloride Level                                                             110


Carbon Dioxide Level                                                        28


Anion Gap                                                                   2  L


Blood Urea Nitrogen                                                         3  L


Creatinine                                                               0.43  L


Est Glomerular        
              
              
              > 60  



Filtrat Rate
mL/min


Glucose Level                                                              139


Calcium Level                                                              9.3


Phosphorus Level                                                           4.0


Magnesium Level                                                            2.3


Total Bilirubin                                                            0.3


Direct Bilirubin                                                          0.00


Indirect Bilirubin                                                         0.3


Aspartate Amino       
              
              
                      31  



Transf
(AST/SGOT)


Alanine               
              
              
                      32  



Aminotransferase
(AL


T/SGPT)


Alkaline Phosphatase                                                        94


Total Protein                                                              6.9


Albumin                                                                    3.3


Globulin                                                                 3.60  H


Albumin/Globulin                                                          0.91


Ratio


Test
                 2/11/19
05:18  2/11/19
09:12  2/11/19
12:38  



Bedside Glucose               121            127            167








Medications


Medication





Current Medications


Sodium Chloride 1,000 ml @  100 mls/hr Q10H IV  Last administered on 2/11/19at 


16:22; Admin Dose 100 MLS/HR;  Start 2/8/19 at 21:30


IV Flush (NS 3 ml) 3 ml PER PROTOCOL IV ;  Start 2/8/19 at 21:30


Ondansetron HCl (Zofran Inj) 4 mg Q6H  PRN IV NAUSEA/VOMITING Last administered 


on 2/11/19at 15:30; Admin Dose 4 MG;  Start 2/8/19 at 21:30


Hydromorphone HCl (Dilaudid) 0.5 mg Q4H  PRN IV .SEVERE PAIN 7-10 Last 


administered on 2/11/19at 14:16; Admin Dose 0.5 MG;  Start 2/8/19 at 21:30


Docusate Sodium (Colace) 100 mg Q12H  PRN PO .CONSTIPATION;  Start 2/8/19 at 


21:30


Bisacodyl (Dulcolax) 5 mg DAILY  PRN PO .CONSTIPATION;  Start 2/8/19 at 21:30


Atorvastatin Calcium (Lipitor) 80 mg QHS PO  Last administered on 2/10/19at 


20:45; Admin Dose 80 MG;  Start 2/8/19 at 22:00


Ceftriaxone Sodium 50 ml @  100 mls/hr Q24H IVPB  Last administered on 2/11/19at


03:57; Admin Dose 100 MLS/HR;  Start 2/9/19 at 02:30


Insulin Glargine (Lantus) 9 units DAILY@2000 SC  Last administered on 2/10/19at 


20:48; Admin Dose 9 UNITS;  Start 2/9/19 at 20:00


Miscellaneous Information 1 ea NOTE XX ;  Start 2/9/19 at 10:30


Glucose (Glutose) 15 gm Q15M  PRN PO DECREASED GLUCOSE;  Start 2/9/19 at 10:30


Glucose (Glutose) 22.5 gm Q15M  PRN PO DECREASED GLUCOSE;  Start 2/9/19 at 10:30


Dextrose (D50w Syringe) 25 ml Q15M  PRN IV DECREASED GLUCOSE;  Start 2/9/19 at 


10:30


Dextrose (D50w Syringe) 50 ml Q15M  PRN IV DECREASED GLUCOSE;  Start 2/9/19 at 


10:30


Glucagon (Glucagen) 1 mg Q15M  PRN IM DECREASED GLUCOSE;  Start 2/9/19 at 10:30


Glucose (Glutose) 15 gm Q15M  PRN BUCCAL DECREASED GLUCOSE;  Start 2/9/19 at 


10:30


Diagnostic Test (Pha) (Accu-Chek) 1 ea 02 XX  Last administered on 2/10/19at 


02:33; Admin Dose 1 EA;  Start 2/10/19 at 02:00


Insulin Aspart (Novolog Insulin Pen) NOVOLOG *MILD* ALGORITHM WITH MEALS  


BEDTIME SC  Last administered on 2/11/19at 12:40; Admin Dose 1 UNIT;  Start 


2/9/19 at 17:55


Methimazole (Tapazole) 10 mg DAILY PO  Last administered on 2/11/19at 09:11; 


Admin Dose 10 MG;  Start 2/10/19 at 13:30











JUJU REBOLLAR MD         Feb 11, 2019 16:27

## 2019-02-11 NOTE — PN
Date/Time of Note


Date/Time of Note


DATE: 2/11/19 


TIME: 13:27





Assessment/Plan


Lines/Catheters


IV Catheter Type (from [facility]):  Saline Lock





Assessment/Plan


Chief Complaint/Hosp Course


1.  Symptomatic cholelithiasis:HIDA noted


-Lap mony tomorrow


-N.p.o.


-IV fluids


2.  Abdominal pain: 2/2?  #1 versus other:


-As above


-Pain management


3.Thrombocytosis:


-Trend


-Further workup if persistent


4.  Normocytic normochromic anemia:


-Monitor and transfuse as needed


5.  Hyponatremia: Resolved


-Judicious fluid correction


6.  Controlled diabetes:


-Glucose control


7.  Hyperthyroidism:


-Med management


8.  Hypoalbuminemia:


-Eventual nutrition optimization


9.  Pyuria


-Frequent bladder emptying





Thank you.  Patient seen and examined in collaboration with Dr. Dawit Styles.





Subjective


24 Hr Interval Summary


Continues to have abdominal pain.  Pending OR tomorrow.  No fevers, chills, sob,


congested cough, cp, palpitations, ha, dizziness, nausea, vomiting, diarrhea, 


dysuria.





Exam/Review of Systems


Vital Signs


Vitals





Vital Signs


  Date      Temp  Pulse  Resp  B/P (MAP)   Pulse Ox  O2          O2 Flow    FiO2


Time                                                 Delivery    Rate


   2/11/19  98.2     80    17      126/68        97


     07:50                           (87)


   2/10/19                                           Room Air


     14:52








Intake and Output





2/10/19


2/10/19


2/11/19





1515:00


23:00


07:00





IntakeIntake Total


350 ml


1380 ml


1250 ml





BalanceBalance


350 ml


1380 ml


1250 ml














Exam


Free Text/Dictation


Constitutional:  alert, oriented


Psych:  nl mood/affect, anxiety (Minimal)


Head:  normocephalic, atraumatic


Eyes:  nl conjunctiva, EOMI, nl lids, nl sclera


ENMT:  nl external ears & nose, nl lips & teeth, mucosa pink and moist


Neck:  supple, non-tender


Respiratory:  normal air movement; 


   No congested cough


Cardiovascular:  regular rate and rhythm, nl pulses


Gastrointestinal:  soft, tender (Generalized); 


   No distended, No firm


Musculoskeletal:  nl extremities to inspection, nl gait and stance


Extremities:  normal pulses; 


   No edema


Neurological:  nl mental status, nl speech, nl strength


Skin:  nl turgor; 


   No rash or lesions


Lymph:  nl lymph nodes





Results


Result Diagram:  


2/11/19 0428                                                                    


           2/11/19 0428














SHANT ARAGON NP       Feb 11, 2019 13:29

## 2019-02-12 VITALS — HEART RATE: 72 BPM | DIASTOLIC BLOOD PRESSURE: 71 MMHG | SYSTOLIC BLOOD PRESSURE: 135 MMHG | RESPIRATION RATE: 18 BRPM

## 2019-02-12 VITALS — DIASTOLIC BLOOD PRESSURE: 110 MMHG | HEART RATE: 121 BPM | SYSTOLIC BLOOD PRESSURE: 201 MMHG | RESPIRATION RATE: 26 BRPM

## 2019-02-12 VITALS — RESPIRATION RATE: 12 BRPM | SYSTOLIC BLOOD PRESSURE: 104 MMHG | HEART RATE: 62 BPM | DIASTOLIC BLOOD PRESSURE: 57 MMHG

## 2019-02-12 VITALS — SYSTOLIC BLOOD PRESSURE: 142 MMHG | HEART RATE: 74 BPM | RESPIRATION RATE: 18 BRPM | DIASTOLIC BLOOD PRESSURE: 63 MMHG

## 2019-02-12 VITALS — SYSTOLIC BLOOD PRESSURE: 112 MMHG | HEART RATE: 66 BPM | RESPIRATION RATE: 15 BRPM | DIASTOLIC BLOOD PRESSURE: 59 MMHG

## 2019-02-12 VITALS — DIASTOLIC BLOOD PRESSURE: 61 MMHG | HEART RATE: 69 BPM | SYSTOLIC BLOOD PRESSURE: 134 MMHG | RESPIRATION RATE: 18 BRPM

## 2019-02-12 VITALS — SYSTOLIC BLOOD PRESSURE: 102 MMHG | HEART RATE: 62 BPM | RESPIRATION RATE: 12 BRPM | DIASTOLIC BLOOD PRESSURE: 53 MMHG

## 2019-02-12 VITALS — SYSTOLIC BLOOD PRESSURE: 102 MMHG | RESPIRATION RATE: 14 BRPM | HEART RATE: 66 BPM | DIASTOLIC BLOOD PRESSURE: 54 MMHG

## 2019-02-12 VITALS — RESPIRATION RATE: 15 BRPM | SYSTOLIC BLOOD PRESSURE: 120 MMHG | HEART RATE: 68 BPM | DIASTOLIC BLOOD PRESSURE: 62 MMHG

## 2019-02-12 VITALS — HEART RATE: 79 BPM | DIASTOLIC BLOOD PRESSURE: 58 MMHG | SYSTOLIC BLOOD PRESSURE: 132 MMHG | RESPIRATION RATE: 18 BRPM

## 2019-02-12 VITALS — RESPIRATION RATE: 25 BRPM | SYSTOLIC BLOOD PRESSURE: 170 MMHG | DIASTOLIC BLOOD PRESSURE: 85 MMHG | HEART RATE: 110 BPM

## 2019-02-12 VITALS — HEART RATE: 71 BPM | SYSTOLIC BLOOD PRESSURE: 128 MMHG | DIASTOLIC BLOOD PRESSURE: 64 MMHG | RESPIRATION RATE: 17 BRPM

## 2019-02-12 VITALS — DIASTOLIC BLOOD PRESSURE: 56 MMHG | SYSTOLIC BLOOD PRESSURE: 106 MMHG | HEART RATE: 62 BPM | RESPIRATION RATE: 12 BRPM

## 2019-02-12 VITALS — RESPIRATION RATE: 18 BRPM | SYSTOLIC BLOOD PRESSURE: 117 MMHG | HEART RATE: 64 BPM | DIASTOLIC BLOOD PRESSURE: 60 MMHG

## 2019-02-12 VITALS — HEART RATE: 75 BPM | RESPIRATION RATE: 16 BRPM | SYSTOLIC BLOOD PRESSURE: 123 MMHG | DIASTOLIC BLOOD PRESSURE: 57 MMHG

## 2019-02-12 VITALS — SYSTOLIC BLOOD PRESSURE: 106 MMHG | HEART RATE: 62 BPM | DIASTOLIC BLOOD PRESSURE: 54 MMHG | RESPIRATION RATE: 12 BRPM

## 2019-02-12 VITALS — RESPIRATION RATE: 25 BRPM | DIASTOLIC BLOOD PRESSURE: 99 MMHG | HEART RATE: 117 BPM | SYSTOLIC BLOOD PRESSURE: 187 MMHG

## 2019-02-12 VITALS — SYSTOLIC BLOOD PRESSURE: 135 MMHG | HEART RATE: 72 BPM | DIASTOLIC BLOOD PRESSURE: 64 MMHG | RESPIRATION RATE: 18 BRPM

## 2019-02-12 VITALS — HEART RATE: 78 BPM | DIASTOLIC BLOOD PRESSURE: 70 MMHG | RESPIRATION RATE: 18 BRPM | SYSTOLIC BLOOD PRESSURE: 147 MMHG

## 2019-02-12 VITALS — SYSTOLIC BLOOD PRESSURE: 127 MMHG | HEART RATE: 67 BPM | DIASTOLIC BLOOD PRESSURE: 62 MMHG | RESPIRATION RATE: 17 BRPM

## 2019-02-12 VITALS — DIASTOLIC BLOOD PRESSURE: 86 MMHG | HEART RATE: 80 BPM | RESPIRATION RATE: 18 BRPM | SYSTOLIC BLOOD PRESSURE: 125 MMHG

## 2019-02-12 VITALS — SYSTOLIC BLOOD PRESSURE: 124 MMHG | RESPIRATION RATE: 25 BRPM | HEART RATE: 100 BPM | DIASTOLIC BLOOD PRESSURE: 60 MMHG

## 2019-02-12 VITALS — SYSTOLIC BLOOD PRESSURE: 127 MMHG | HEART RATE: 72 BPM | DIASTOLIC BLOOD PRESSURE: 65 MMHG | RESPIRATION RATE: 18 BRPM

## 2019-02-12 VITALS — RESPIRATION RATE: 18 BRPM | HEART RATE: 73 BPM | DIASTOLIC BLOOD PRESSURE: 66 MMHG | SYSTOLIC BLOOD PRESSURE: 132 MMHG

## 2019-02-12 VITALS — RESPIRATION RATE: 17 BRPM | SYSTOLIC BLOOD PRESSURE: 117 MMHG | DIASTOLIC BLOOD PRESSURE: 62 MMHG | HEART RATE: 66 BPM

## 2019-02-12 VITALS — RESPIRATION RATE: 19 BRPM | SYSTOLIC BLOOD PRESSURE: 115 MMHG | DIASTOLIC BLOOD PRESSURE: 60 MMHG | HEART RATE: 72 BPM

## 2019-02-12 VITALS — HEART RATE: 123 BPM | RESPIRATION RATE: 26 BRPM | SYSTOLIC BLOOD PRESSURE: 199 MMHG | DIASTOLIC BLOOD PRESSURE: 100 MMHG

## 2019-02-12 VITALS — DIASTOLIC BLOOD PRESSURE: 68 MMHG | HEART RATE: 82 BPM | RESPIRATION RATE: 17 BRPM | SYSTOLIC BLOOD PRESSURE: 139 MMHG

## 2019-02-12 VITALS — RESPIRATION RATE: 10 BRPM | SYSTOLIC BLOOD PRESSURE: 100 MMHG | HEART RATE: 60 BPM | DIASTOLIC BLOOD PRESSURE: 55 MMHG

## 2019-02-12 VITALS — RESPIRATION RATE: 18 BRPM | HEART RATE: 72 BPM | DIASTOLIC BLOOD PRESSURE: 72 MMHG | SYSTOLIC BLOOD PRESSURE: 132 MMHG

## 2019-02-12 VITALS — HEART RATE: 70 BPM | DIASTOLIC BLOOD PRESSURE: 58 MMHG | SYSTOLIC BLOOD PRESSURE: 115 MMHG | RESPIRATION RATE: 20 BRPM

## 2019-02-12 VITALS — RESPIRATION RATE: 14 BRPM | SYSTOLIC BLOOD PRESSURE: 106 MMHG | DIASTOLIC BLOOD PRESSURE: 57 MMHG | HEART RATE: 64 BPM

## 2019-02-12 LAB
ADD MAN DIFF?: NO
ALANINE AMINOTRANSFERASE: 28 IU/L (ref 13–69)
ALBUMIN/GLOBULIN RATIO: 0.96
ALBUMIN: 3.2 G/DL (ref 3.3–4.9)
ALKALINE PHOSPHATASE: 103 IU/L (ref 42–121)
ANION GAP: 10 (ref 5–13)
ASPARTATE AMINO TRANSFERASE: 34 IU/L (ref 15–46)
BASOPHIL #: 0 10^3/UL (ref 0–0.1)
BASOPHILS %: 0.6 % (ref 0–2)
BILIRUBIN,DIRECT: 0 MG/DL (ref 0–0.2)
BILIRUBIN,TOTAL: 0.2 MG/DL (ref 0.2–1.3)
BLOOD UREA NITROGEN: 3 MG/DL (ref 7–20)
CALCIUM: 9.1 MG/DL (ref 8.4–10.2)
CARBON DIOXIDE: 25 MMOL/L (ref 21–31)
CHLORIDE: 107 MMOL/L (ref 97–110)
CREATININE: 0.43 MG/DL (ref 0.44–1)
EOSINOPHILS #: 0.1 10^3/UL (ref 0–0.5)
EOSINOPHILS %: 1.3 % (ref 0–7)
GLOBULIN: 3.3 G/DL (ref 1.3–3.2)
GLUCOSE: 101 MG/DL (ref 70–220)
HEMATOCRIT: 33.1 % (ref 37–47)
HEMOGLOBIN: 11.1 G/DL (ref 12–16)
IMMATURE GRANS #M: 0.02 10^3/UL (ref 0–0.03)
IMMATURE GRANS % (M): 0.3 % (ref 0–0.43)
INR: 1.05
LYMPHOCYTES #: 2.1 10^3/UL (ref 0.8–2.9)
LYMPHOCYTES %: 30.7 % (ref 15–51)
MAGNESIUM: 2.2 MG/DL (ref 1.7–2.5)
MEAN CORPUSCULAR HEMOGLOBIN: 29.9 PG (ref 29–33)
MEAN CORPUSCULAR HGB CONC: 33.5 G/DL (ref 32–37)
MEAN CORPUSCULAR VOLUME: 89.2 FL (ref 82–101)
MEAN PLATELET VOLUME: 10.4 FL (ref 7.4–10.4)
MONOCYTE #: 0.6 10^3/UL (ref 0.3–0.9)
MONOCYTES %: 9.2 % (ref 0–11)
NEUTROPHIL #: 4 10^3/UL (ref 1.6–7.5)
NEUTROPHILS %: 57.9 % (ref 39–77)
NUCLEATED RED BLOOD CELLS #: 0 10^3/UL (ref 0–0)
NUCLEATED RED BLOOD CELLS%: 0 /100WBC (ref 0–0)
PHOSPHORUS: 3.7 MG/DL (ref 2.5–4.9)
PLATELET COUNT: 407 10^3/UL (ref 140–415)
POTASSIUM: 3.5 MMOL/L (ref 3.5–5.1)
PROTIME: 13.8 SEC (ref 11.9–14.9)
PT RATIO: 1.1
RED BLOOD COUNT: 3.71 10^6/UL (ref 4.2–5.4)
RED CELL DISTRIBUTION WIDTH: 12.4 % (ref 11.5–14.5)
SODIUM: 142 MMOL/L (ref 135–144)
TOTAL PROTEIN: 6.5 G/DL (ref 6.1–8.1)
WHITE BLOOD COUNT: 6.9 10^3/UL (ref 4.8–10.8)

## 2019-02-12 PROCEDURE — 0FT44ZZ RESECTION OF GALLBLADDER, PERCUTANEOUS ENDOSCOPIC APPROACH: ICD-10-PCS

## 2019-02-12 PROCEDURE — 0FB04ZX EXCISION OF LIVER, PERCUTANEOUS ENDOSCOPIC APPROACH, DIAGNOSTIC: ICD-10-PCS

## 2019-02-12 RX ADMIN — HYDROCODONE BITARTRATE AND ACETAMINOPHEN 1 TAB: 10; 325 TABLET ORAL at 17:54

## 2019-02-12 RX ADMIN — INSULIN ASPART 1 UNIT: 100 INJECTION, SOLUTION INTRAVENOUS; SUBCUTANEOUS at 07:50

## 2019-02-12 RX ADMIN — BISACODYL 1 MG: 5 TABLET, COATED ORAL at 21:00

## 2019-02-12 RX ADMIN — INSULIN ASPART 1 UNIT: 100 INJECTION, SOLUTION INTRAVENOUS; SUBCUTANEOUS at 20:54

## 2019-02-12 RX ADMIN — CEFTRIAXONE 1 MLS/HR: 1 INJECTION, SOLUTION INTRAVENOUS at 01:56

## 2019-02-12 RX ADMIN — LIDOCAINE HYDROCHLORIDE 1 ML: 10 INJECTION, SOLUTION EPIDURAL; INFILTRATION; INTRACAUDAL; PERINEURAL at 09:40

## 2019-02-12 RX ADMIN — ATORVASTATIN CALCIUM SCH MG: 80 TABLET, FILM COATED ORAL at 20:51

## 2019-02-12 RX ADMIN — CEFTRIAXONE SCH MLS/HR: 1 INJECTION, SOLUTION INTRAVENOUS at 01:56

## 2019-02-12 RX ADMIN — ONDANSETRON HYDROCHLORIDE 1 MG: 2 INJECTION, SOLUTION INTRAMUSCULAR; INTRAVENOUS at 11:04

## 2019-02-12 RX ADMIN — THIAMINE HYDROCHLORIDE 1 MLS/HR: 100 INJECTION, SOLUTION INTRAMUSCULAR; INTRAVENOUS at 01:56

## 2019-02-12 RX ADMIN — DIPHENHYDRAMINE HYDROCHLORIDE SCH MG: 50 INJECTION, SOLUTION INTRAMUSCULAR; INTRAVENOUS at 08:28

## 2019-02-12 RX ADMIN — ATORVASTATIN CALCIUM 1 MG: 80 TABLET, FILM COATED ORAL at 20:51

## 2019-02-12 RX ADMIN — FOLIC ACID SCH MLS/HR: 5 INJECTION, SOLUTION INTRAMUSCULAR; INTRAVENOUS; SUBCUTANEOUS at 14:59

## 2019-02-12 RX ADMIN — HYDROMORPHONE HYDROCHLORIDE 1 MG: 2 INJECTION INTRAMUSCULAR; INTRAVENOUS; SUBCUTANEOUS at 11:04

## 2019-02-12 RX ADMIN — METHIMAZOLE 1 MG: 5 TABLET ORAL at 12:57

## 2019-02-12 RX ADMIN — FAMOTIDINE 1 MG: 10 INJECTION, SOLUTION INTRAVENOUS at 08:28

## 2019-02-12 RX ADMIN — INSULIN GLARGINE SCH UNITS: 100 INJECTION, SOLUTION SUBCUTANEOUS at 20:55

## 2019-02-12 RX ADMIN — INSULIN ASPART 1 UNIT: 100 INJECTION, SOLUTION INTRAVENOUS; SUBCUTANEOUS at 12:58

## 2019-02-12 RX ADMIN — DOCUSATE SODIUM PRN MG: 100 CAPSULE, LIQUID FILLED ORAL at 21:00

## 2019-02-12 RX ADMIN — INSULIN ASPART 1 UNIT: 100 INJECTION, SOLUTION INTRAVENOUS; SUBCUTANEOUS at 17:55

## 2019-02-12 RX ADMIN — LABETALOL HYDROCHLORIDE 1 MG: 5 INJECTION, SOLUTION INTRAVENOUS at 11:19

## 2019-02-12 RX ADMIN — INSULIN GLARGINE 1 UNITS: 100 INJECTION, SOLUTION SUBCUTANEOUS at 20:55

## 2019-02-12 RX ADMIN — BISACODYL PRN MG: 5 TABLET, COATED ORAL at 21:00

## 2019-02-12 RX ADMIN — METHIMAZOLE SCH MG: 5 TABLET ORAL at 12:57

## 2019-02-12 RX ADMIN — HYDROCODONE BITARTRATE AND ACETAMINOPHEN 1 TAB: 10; 325 TABLET ORAL at 20:57

## 2019-02-12 RX ADMIN — DOCUSATE SODIUM 1 MG: 100 CAPSULE, LIQUID FILLED ORAL at 21:00

## 2019-02-12 RX ADMIN — BUPIVACAINE HYDROCHLORIDE AND EPINEPHRINE BITARTRATE 1 ML: 5; .005 INJECTION, SOLUTION EPIDURAL; INTRACAUDAL; PERINEURAL at 09:39

## 2019-02-12 RX ADMIN — THIAMINE HYDROCHLORIDE 1 MLS/HR: 100 INJECTION, SOLUTION INTRAMUSCULAR; INTRAVENOUS at 14:59

## 2019-02-12 RX ADMIN — FOLIC ACID SCH MLS/HR: 5 INJECTION, SOLUTION INTRAMUSCULAR; INTRAVENOUS; SUBCUTANEOUS at 01:56

## 2019-02-12 NOTE — NUR
EOSS

PT IN STABLE CONDITION, VS STABLE, NPO FOR SURGERY THAT SCHEDULED AT 0930 AM. MEDICATED FOR 
PAIN WITH DILAUDID IV. AMBULATORY, BRP. BS CONTROLLED. FALLS PRECAUTIONS OBSERVED. WILL 
ENDORSE TO NEXT SHIFT RN.

## 2019-02-12 NOTE — NUR
END OF SHIFT NOTES



PATIENT  TOLERATED DINNER WELL, NO C/O NAUSEA AT THIS TIME, VERBALIZED C/O PAIN AND MANAGED 
WITH NORCO 10/325 MG 1 TAB, WITH HELP AND AS PER PT PAIN LEVEL WENT DOWN TO 0/10. PATIENT 
CONTINUES TO AMBULATE TO BATHROOM WITH STANDBY ASSIST. PT CONTINUES TO RECEIVE IVF AS 
ORDERED VIA IVP LINE ON RIGHT HAND, INTACT AND PATENT. KEPT PT'S BED IN LOW POSITION, CALL 
LIGHT PLACED WITHIN EASY REACH. ALL NEEDS ATTENDED TO, KEPT SAFE AND DRY, CLEAN AND 
COMFORTABLE.

## 2019-02-12 NOTE — NUR
BLLOD SUGAR PER ACCU .CHECK 132. ICE PACK TO ABDOMEN APPLIED .ABDOMEN WITH 4 SMALL 
LAPAROSCOPIC INCISIONS COVERED WITH DERMA BAND AND STERI STRIPS

## 2019-02-12 NOTE — NUR
RECEIVED PATIENT FROM OR POST LAPAROSCOPIC CHOLECYSTECTOMY UNDER GENERAL ANESTHESIA . ON O2 
6/ VIA FACE MASK .NOTED NO CHEST RISING EVEN O2 % .PATIENT UNRESPONSIVE. DR. MOLINA 
ANESTHESIOLOGIST ALERTED ./111 HR .126 .. PATIENT REVERESED BY DR ESCOBAR .LABETALOL 
5MG IV GIVEN.

## 2019-02-12 NOTE — NUR
RECEIVED REPORT FROM BAKARI IN RECOVERY ROOM, PATIENT RECEIVED BACK IN ROOM, VITAL SIGNS 
STABLE. 3 LAP SITES WITH DERMABOND, OPEN TO AIR, DRY AND INTACT, NOTED WITH NO DISCHARGE AND 
NO BLEEDING. SCDs ON BLE, PLACED BED IN LOW POSITION, CALL LIGHT PLACED WITHIN EASY REACH. 
PROVIDED WITH CLEAR LIQUIDS, TOLERATED WELL. WILL CONTINUE TO MONITOR.

## 2019-02-12 NOTE — OPR
Date/Time of Note


Date/Time of Note


DATE: 2/12/19 


TIME: 08:58





Operative Report


Free Text/Dictation


Preoperative Diagnosis:


Symptomatic cholelithiasis 





Postoperative Diagnosis:


Symptomatic cholelithiasis 


Abnormal liver color





Operation(s) Performed:


1. 3 port laparoscopic cholecystectomy


2.  Laparoscopic liver wedge resection biopsy


3.  Indigocarmine green fluorescence cholangiography


4.  Local anesthetic injection, [id number]


5.  Laparoscopic guided bilateral transversus abdominis plane block





Surgeon:  Galen Talley MD





Assistant: Joya Bajwa NP





Anesthesia:  general,  local, & regional





Anesthesiologist: MD Fidelia





Estimated Blood Loss: <5 ml's





Specimens:


Gallbladder


Liver





Tubes/Drains:


None





Complications:  None





Pt Condition Post Procedure:  stable





Disposition:  PACU





Indications:


58-year-old female with gallstones and abdominal pain here for cholecystectomy.





Risks include but are not limited to bleeding, infection, abscess, seroma, 


damage to intestines, damage to the liver, damage to biliary tree, hernia 


formation, chronic pain, biloma, need for reoperations or further surgeries, MI,


stroke, PE, DVT, pneumonia, organ failures, or even death.





Procedure Description:


Patient was brought and placed supine on the operating table SCDs were placed, 


preoperative antibiotics were administered, all pressure points were well-


padded, and after induction of anesthesia patient was prepped and draped in 


usual sterile fashion and timeout was performed.  Incision was made in the 


supraumbilical region, Veress was safely inserted, and after a negative SIP 


test, abdomen was insufflated to 15mmHg.  Veress was removed and 5mm port was 


safely inserted.  Laparoscopy was performed with a 5 mm 30 scope. No injuries 


were identified.  The gallbladder is without evidence of inflammation and 


infection.  The liver however looks abnormal color.  12 mm port is placed in 


subxiphoid under direct visualization followed by another 5 mm port in the right


upper quadrant.





All port sites were injected with quarter percent Marcaine with epi and 1% 


lidocaine prior to any incisions.





Bilateral transversus abdominis plane block was performed under laparoscopic 


visualization to aid with pain control intra-and postoperatively.





Patient was placed in reverse Trendelenburg and right side up on gallbladder was


retracted superolaterally. Using electrocautery and blunt dissection I was able 


to identify the cystic artery and cystic duct. The duct was small and tapered 


into the gallbladder.  Full critical angle view was identified.  Gentle 


dissection identified cystic artery and duct.  





The cystic artery and duct were clipped twice proximally once distally then 


transected.  Gallbladder was placed in an Endo Catch bag and removed through the


subxiphoid port site. There was complete hemostasis.





Due to the abnormality of LFTs decision was made to perform liver wedge 


resection which was done with electrocautery and scissor with complete 


hemostasis right after. The specimen was sent to pathology for further 


evaluation.  





12 mm made port site fascia was closed with Endo Close of an 0 Vicryl in a 


figure-of-eight manner. Ports and CO2 were removed under direct visualization. 


Next complete hemostasis. Wounds were thoroughly irrigated skin was closed with 


4-0 Monocryl in subcuticular fashion. Dermabond was applied. Patient was 


extubated and transferred to recovery room in stable condition and all counts 


were correct and the end of the operation 2.











GALEN TALLEY MD              Feb 12, 2019 08:59

## 2019-02-12 NOTE — PN
Date/Time of Note


Date/Time of Note


DATE: 2/12/19 


TIME: 08:58





Assessment/Plan


Lines/Catheters


IV Catheter Type (from [facility]):  Peripheral IV





Assessment/Plan


Chief Complaint/Hosp Course


1.  Symptomatic cholelithiasis:HIDA negative


-Lap mony today


-N.p.o.


-IV fluids


2.  Abdominal pain: 2/2?  #1 versus other:


-As above


-Pain management


3.Thrombocytosis:


-Trend


-Further workup if persistent


4.  Normocytic normochromic anemia:


-Monitor and transfuse as needed


5.  Diabetes


-diet/med optimization


7.  Hyperthyroidism:


-Med management


8.  Hypoalbuminemia:


-Eventual nutrition optimization


9.  Pyuria


-Frequent bladder emptying





Thank you





Subjective


24 Hr Interval Summary


Continues to have abdominal pain.  OR today.  No fevers, chills, sob, congested 


cough, cp, palpitations, ha, dizziness, nausea, vomiting, diarrhea, dysuria.





Exam/Review of Systems


Vital Signs


Vitals





Vital Signs


  Date      Temp  Pulse  Resp  B/P (MAP)   Pulse Ox  O2          O2 Flow    FiO2


Time                                                 Delivery    Rate


   2/12/19  98.4     75    16      123/57        96  Room Air


     00:34                           (79)








Intake and Output





2/11/19 2/11/19 2/12/19





1515:00


23:00


07:00





IntakeIntake Total


1300 ml


1450 ml





BalanceBalance


1300 ml


1450 ml














Exam


Free Text/Dictation


Constitutional:  alert, oriented


Psych:  nl mood/affect, anxiety (Minimal)


Head:  normocephalic, atraumatic


Eyes:  nl conjunctiva, EOMI, nl lids, nl sclera


ENMT:  nl external ears & nose, nl lips & teeth, mucosa pink and moist


Neck:  supple, non-tender


Respiratory:  normal air movement; 


   No congested cough


Cardiovascular:  regular rate and rhythm, nl pulses


Gastrointestinal:  soft, tender (Generalized); 


   No distended, No firm


Musculoskeletal:  nl extremities to inspection, nl gait and stance


Extremities:  normal pulses; 


   No edema


Neurological:  nl mental status, nl speech, nl strength


Skin:  nl turgor; 


   No rash or lesions


Lymph:  nl lymph nodes





Results


Result Diagram:  


2/12/19 0432 2/12/19 0432














GALEN TALLEY MD              Feb 12, 2019 08:58

## 2019-02-12 NOTE — PN
Date/Time of Note


Date/Time of Note


DATE: 2/12/19 


TIME: 12:12





Assessment/Plan


VTE Prophylaxis


Risk score (from Nsg)>0 risk:  3


SCD applied (from Nsg):  Yes


SCD contraindicated:  low risk/ambulating


Pharmacological prophylaxis:  LMWH





Lines/Catheters


IV Catheter Type (from Nrsg):  Peripheral IV





Assessment/Plan


Hospital Course





Assessment and plan


1.  Symptomatic cholelithiasis, sp lap chol; stable treat pain


2.  Type 2 diabetes/metabolic syndrome


3.  Anemia stable


4.  Cystitis?  3-day therapy


5.  Subclinical hyperparathyroidism


6.  Accelerated hypertension, treat pain





S: 


2/11 no events.  No fever vomiting


2/12 events noted





O: Vss





PE


No pallor icterus


Regular


Clear


Bs dimin mod tender; nd, no RRG


No edema


Result Diagram:  


2/12/19 0432                                                                    


           2/12/19 0432





Results 24hrs





Laboratory Tests


Test
                 2/11/19
12:38  2/11/19
17:45  2/11/19
20:24  2/11/19
21:14


Bedside Glucose               167             95            102            101


Test
                 2/12/19
04:30  2/12/19
04:32  2/12/19
08:27  2/12/19
10:23


Serum HCG,            NEGATIVE


Qualitative


White Blood Count                            6.9


Red Blood Count                            3.71  L


Hemoglobin                                 11.1  L


Hematocrit                                 33.1  L


Mean Corpuscular                            89.2


Volume


Mean Corpuscular                            29.9


Hemoglobin


Mean Corpuscular      
                    33.5  
  
              



Hemoglobin
Concent


Red Cell                                    12.4


Distribution Width


Platelet Count                               407


Mean Platelet Volume                        10.4


Immature                                   0.300


Granulocytes %


Neutrophils %                               57.9


Lymphocytes %                               30.7


Monocytes %                                  9.2


Eosinophils %                                1.3


Basophils %                                  0.6


Nucleated Red Blood                          0.0


Cells %


Immature                                   0.020


Granulocytes #


Neutrophils #                                4.0


Lymphocytes #                                2.1


Monocytes #                                  0.6


Eosinophils #                                0.1


Basophils #                                  0.0


Nucleated Red Blood                          0.0


Cells #


Prothrombin Time                            13.8


Prothrombin Time                             1.1


Ratio


INR International     
                    1.05  
  
              



Normalized
Ratio


Sodium Level                                 142


Potassium Level                              3.5


Chloride Level                               107


Carbon Dioxide Level                          25


Anion Gap                                    10  #


Blood Urea Nitrogen                           3  L


Creatinine                                 0.43  L


Est Glomerular        
              > 60  
        
              



Filtrat Rate
mL/min


Glucose Level                                101


Calcium Level                                9.1


Phosphorus Level                             3.7


Magnesium Level                              2.2


Total Bilirubin                              0.2


Direct Bilirubin                            0.00


Indirect Bilirubin                           0.2


Aspartate Amino       
                      34  
  
              



Transf
(AST/SGOT)


Alanine               
                      28  
  
              



Aminotransferase
(AL


T/SGPT)


Alkaline Phosphatase                         103


Total Protein                                6.5


Albumin                                     3.2  L


Globulin                                   3.30  H


Albumin/Globulin                            0.96


Ratio


Bedside Glucose                                              97            132








Exam/Review of Systems


Exam


Vitals





Vital Signs


  Date      Temp  Pulse  Resp  B/P (MAP)   Pulse Ox  O2          O2 Flow    FiO2


Time                                                 Delivery    Rate


   2/12/19  97.5     69    18      134/61        96  Room Air


     12:01                           (85)


   2/12/19                                                             6.0


     10:15








Intake and Output





2/11/19 2/11/19 2/12/19





1515:00


23:00


07:00





IntakeIntake Total


1300 ml


1450 ml





BalanceBalance


1300 ml


1450 ml














Results


Results 24hrs





Laboratory Tests


Test
                 2/11/19
12:38  2/11/19
17:45  2/11/19
20:24  2/11/19
21:14


Bedside Glucose               167             95            102            101


Test
                 2/12/19
04:30  2/12/19
04:32  2/12/19
08:27  2/12/19
10:23


Serum HCG,            NEGATIVE


Qualitative


White Blood Count                            6.9


Red Blood Count                            3.71  L


Hemoglobin                                 11.1  L


Hematocrit                                 33.1  L


Mean Corpuscular                            89.2


Volume


Mean Corpuscular                            29.9


Hemoglobin


Mean Corpuscular      
                    33.5  
  
              



Hemoglobin
Concent


Red Cell                                    12.4


Distribution Width


Platelet Count                               407


Mean Platelet Volume                        10.4


Immature                                   0.300


Granulocytes %


Neutrophils %                               57.9


Lymphocytes %                               30.7


Monocytes %                                  9.2


Eosinophils %                                1.3


Basophils %                                  0.6


Nucleated Red Blood                          0.0


Cells %


Immature                                   0.020


Granulocytes #


Neutrophils #                                4.0


Lymphocytes #                                2.1


Monocytes #                                  0.6


Eosinophils #                                0.1


Basophils #                                  0.0


Nucleated Red Blood                          0.0


Cells #


Prothrombin Time                            13.8


Prothrombin Time                             1.1


Ratio


INR International     
                    1.05  
  
              



Normalized
Ratio


Sodium Level                                 142


Potassium Level                              3.5


Chloride Level                               107


Carbon Dioxide Level                          25


Anion Gap                                    10  #


Blood Urea Nitrogen                           3  L


Creatinine                                 0.43  L


Est Glomerular        
              > 60  
        
              



Filtrat Rate
mL/min


Glucose Level                                101


Calcium Level                                9.1


Phosphorus Level                             3.7


Magnesium Level                              2.2


Total Bilirubin                              0.2


Direct Bilirubin                            0.00


Indirect Bilirubin                           0.2


Aspartate Amino       
                      34  
  
              



Transf
(AST/SGOT)


Alanine               
                      28  
  
              



Aminotransferase
(AL


T/SGPT)


Alkaline Phosphatase                         103


Total Protein                                6.5


Albumin                                     3.2  L


Globulin                                   3.30  H


Albumin/Globulin                            0.96


Ratio


Bedside Glucose                                              97            132








Medications


Medication





Current Medications


Sodium Chloride 1,000 ml @  100 mls/hr Q10H IV  Last administered on 2/12/19at 


01:56; Admin Dose 100 MLS/HR;  Start 2/8/19 at 21:30


IV Flush (NS 3 ml) 3 ml PER PROTOCOL IV ;  Start 2/8/19 at 21:30


Ondansetron HCl (Zofran Inj) 4 mg Q6H  PRN IV NAUSEA/VOMITING Last administered 


on 2/11/19at 21:15; Admin Dose 4 MG;  Start 2/8/19 at 21:30


Hydromorphone HCl (Dilaudid) 0.5 mg Q4H  PRN IV .SEVERE PAIN 7-10 Last 


administered on 2/11/19at 20:26; Admin Dose 0.5 MG;  Start 2/8/19 at 21:30


Docusate Sodium (Colace) 100 mg Q12H  PRN PO .CONSTIPATION;  Start 2/8/19 at 


21:30


Bisacodyl (Dulcolax) 5 mg DAILY  PRN PO .CONSTIPATION;  Start 2/8/19 at 21:30


Atorvastatin Calcium (Lipitor) 80 mg QHS PO  Last administered on 2/11/19at 


21:15; Admin Dose 80 MG;  Start 2/8/19 at 22:00


Ceftriaxone Sodium 50 ml @  100 mls/hr Q24H IVPB  Last administered on 2/12/19at


01:56; Admin Dose 100 MLS/HR;  Start 2/9/19 at 02:30


Miscellaneous Information 1 ea NOTE XX ;  Start 2/9/19 at 10:30


Glucose (Glutose) 15 gm Q15M  PRN PO DECREASED GLUCOSE;  Start 2/9/19 at 10:30


Glucose (Glutose) 22.5 gm Q15M  PRN PO DECREASED GLUCOSE;  Start 2/9/19 at 10:30


Dextrose (D50w Syringe) 25 ml Q15M  PRN IV DECREASED GLUCOSE;  Start 2/9/19 at 


10:30


Dextrose (D50w Syringe) 50 ml Q15M  PRN IV DECREASED GLUCOSE;  Start 2/9/19 at 


10:30


Glucagon (Glucagen) 1 mg Q15M  PRN IM DECREASED GLUCOSE;  Start 2/9/19 at 10:30


Glucose (Glutose) 15 gm Q15M  PRN BUCCAL DECREASED GLUCOSE;  Start 2/9/19 at 


10:30


Diagnostic Test (Pha) (Accu-Chek) 1 ea 02 XX  Last administered on 2/10/19at 


02:33; Admin Dose 1 EA;  Start 2/10/19 at 02:00


Insulin Aspart (Novolog Insulin Pen) NOVOLOG *MILD* ALGORITHM WITH MEALS  


BEDTIME SC  Last administered on 2/11/19at 12:40; Admin Dose 1 UNIT;  Start 


2/9/19 at 17:55


Methimazole (Tapazole) 10 mg DAILY PO  Last administered on 2/11/19at 09:11; 


Admin Dose 10 MG;  Start 2/10/19 at 13:30


Insulin Glargine (Lantus) 4 units DAILY@2000 SC  Last administered on 2/11/19at 


20:28; Admin Dose 4 UNITS;  Start 2/11/19 at 20:00


Famotidine (Pepcid Iv) 20 mg DAILY IV  Last administered on 2/12/19at 08:28; 


Admin Dose 20 MG;  Start 2/12/19 at 09:00


Hydromorphone HCl (Dilaudid) 0.2 mg PACU PRN IV MILD PAIN 1-3;  Start 2/12/19 at


10:30;  Stop 2/12/19 at 16:00


Hydromorphone HCl (Dilaudid) 0.4 mg PACU PRN IV MOD PAIN 4-6;  Start 2/12/19 at 


10:30;  Stop 2/12/19 at 16:00


Hydromorphone HCl (Dilaudid) 0.6 mg PACU PRN IV SEVERE PAIN 7-10 Last 


administered on 2/12/19at 11:04; Admin Dose 0.6 MG;  Start 2/12/19 at 10:30;  


Stop 2/12/19 at 16:00


Fentanyl (Sublimaze) 25 mcg PACU ORDER PRN IV MILD PAIN 1-3;  Start 2/12/19 at 


10:30;  Stop 2/12/19 at 16:00


Fentanyl (Sublimaze) 50 mcg PACU ORDER PRN IV MOD PAIN 4-6;  Start 2/12/19 at 


10:30;  Stop 2/12/19 at 16:00


Fentanyl (Sublimaze) 75 mcg PACU ORDER PRN IV SEVERE PAIN 7-10;  Start 2/12/19 


at 10:30;  Stop 2/12/19 at 16:00


Ondansetron HCl (Zofran Inj) 4 mg PACU ORDER PRN IV NAUSEA/VOMITING Last 


administered on 2/12/19at 11:04; Admin Dose 4 MG;  Start 2/12/19 at 10:30;  Stop


2/12/19 at 16:00


Metoclopramide HCl (Reglan) 10 mg PACU ORDER PRN IV NAUSEA/VOMITING;  Start 


2/12/19 at 10:30;  Stop 2/12/19 at 16:00


Labetalol HCl (Labetalol) 5 mg PACU ORDER PRN IV HIGH BLOOD PRESSURE Last 


administered on 2/12/19at 11:19; Admin Dose 5 MG;  Start 2/12/19 at 10:30;  Stop


2/12/19 at 16:00


Hydralazine HCl (Apresoline) 5 mg PACU ORDER PRN IV HIGH BLOOD PRESSURE;  Start 


2/12/19 at 10:30;  Stop 2/12/19 at 16:00


Ephedrine Sulfate 5 mg PACU ORDER PRN IV BLOOD PRESSURE SUPPORT;  Start 2/12/19 


at 10:30;  Stop 2/12/19 at 16:00


Albuterol (Proventil 0.083% (Neb)) 2.5 mg PACU ORDER PRN HHN .WHEEZING;  Start 


2/12/19 at 10:30;  Stop 2/12/19 at 16:00


Meperidine HCl (Demerol) 25 mg PACU ORDER PRN IV .RIGORS;  Start 2/12/19 at 


10:30;  Stop 2/12/19 at 16:00


Midazolam HCl (Versed) 0.5 mg PACU ORDER PRN IV .ANXIETY;  Start 2/12/19 at 


10:30;  Stop 2/12/19 at 16:00











JUJU REBOLLAR MD         Feb 12, 2019 12:13

## 2019-02-12 NOTE — PAC
Date/Time of Note


Date/Time of Note


DATE: 2/12/19 


TIME: 10:23





Post-Anesthesia Notes


Post-Anesthesia Note


Last documented vital signs





Vital Signs


  Date      Temp  Pulse  Resp  B/P (MAP)   Pulse Ox  O2          O2 Flow    FiO2


Time                                                 Delivery    Rate


   2/12/19  98.1


     10:17


   2/12/19  98.1     75    16      123/57        96  Room Air


     10:15                           (79)





Activity:  WNL


Respiratory function:  WNL


Cardiovascular function:  WNL


Mental status:  Baseline


Pain reasonably controlled:  Yes


Hydration appropriate:  Yes


Nausea/Vomiting absent:  Yes











LEONARD MOLINA                 Feb 12, 2019 10:26

## 2019-02-12 NOTE — NUR
TRANSFERRED BACK TO [facility] IN STABLE CONDITION .DENIES PAIN V/S STABLE INCISIONS DRY AND 
INTACT NO SIGN OF BLEEDING .REPORT GIVEN TO DAVID LAI.

## 2019-02-13 VITALS — DIASTOLIC BLOOD PRESSURE: 99 MMHG | RESPIRATION RATE: 18 BRPM | SYSTOLIC BLOOD PRESSURE: 139 MMHG | HEART RATE: 75 BPM

## 2019-02-13 VITALS — SYSTOLIC BLOOD PRESSURE: 119 MMHG | DIASTOLIC BLOOD PRESSURE: 64 MMHG | RESPIRATION RATE: 18 BRPM | HEART RATE: 80 BPM

## 2019-02-13 VITALS — DIASTOLIC BLOOD PRESSURE: 71 MMHG | SYSTOLIC BLOOD PRESSURE: 148 MMHG | RESPIRATION RATE: 18 BRPM | HEART RATE: 81 BPM

## 2019-02-13 VITALS — DIASTOLIC BLOOD PRESSURE: 63 MMHG | HEART RATE: 72 BPM | SYSTOLIC BLOOD PRESSURE: 125 MMHG | RESPIRATION RATE: 18 BRPM

## 2019-02-13 LAB
ADD MAN DIFF?: NO
ALANINE AMINOTRANSFERASE: 46 IU/L (ref 13–69)
ALBUMIN/GLOBULIN RATIO: 0.94
ALBUMIN: 3.2 G/DL (ref 3.3–4.9)
ALKALINE PHOSPHATASE: 93 IU/L (ref 42–121)
ANION GAP: 12 (ref 5–13)
ASPARTATE AMINO TRANSFERASE: 42 IU/L (ref 15–46)
BASOPHIL #: 0 10^3/UL (ref 0–0.1)
BASOPHILS %: 0.4 % (ref 0–2)
BILIRUBIN,DIRECT: 0 MG/DL (ref 0–0.2)
BILIRUBIN,TOTAL: 0.1 MG/DL (ref 0.2–1.3)
BLOOD UREA NITROGEN: 7 MG/DL (ref 7–20)
CALCIUM: 9.1 MG/DL (ref 8.4–10.2)
CARBON DIOXIDE: 23 MMOL/L (ref 21–31)
CHLORIDE: 105 MMOL/L (ref 97–110)
CREATININE: 0.43 MG/DL (ref 0.44–1)
EOSINOPHILS #: 0 10^3/UL (ref 0–0.5)
EOSINOPHILS %: 0 % (ref 0–7)
GLOBULIN: 3.4 G/DL (ref 1.3–3.2)
GLUCOSE: 183 MG/DL (ref 70–220)
HEMATOCRIT: 33 % (ref 37–47)
HEMOGLOBIN: 11 G/DL (ref 12–16)
IMMATURE GRANS #M: 0.03 10^3/UL (ref 0–0.03)
IMMATURE GRANS % (M): 0.4 % (ref 0–0.43)
LYMPHOCYTES #: 2 10^3/UL (ref 0.8–2.9)
LYMPHOCYTES %: 24.7 % (ref 15–51)
MEAN CORPUSCULAR HEMOGLOBIN: 29.6 PG (ref 29–33)
MEAN CORPUSCULAR HGB CONC: 33.3 G/DL (ref 32–37)
MEAN CORPUSCULAR VOLUME: 88.7 FL (ref 82–101)
MEAN PLATELET VOLUME: 10.7 FL (ref 7.4–10.4)
MONOCYTE #: 0.7 10^3/UL (ref 0.3–0.9)
MONOCYTES %: 8.3 % (ref 0–11)
NEUTROPHIL #: 5.4 10^3/UL (ref 1.6–7.5)
NEUTROPHILS %: 66.2 % (ref 39–77)
NUCLEATED RED BLOOD CELLS #: 0 10^3/UL (ref 0–0)
NUCLEATED RED BLOOD CELLS%: 0 /100WBC (ref 0–0)
PLATELET COUNT: 417 10^3/UL (ref 140–415)
POTASSIUM: 3.7 MMOL/L (ref 3.5–5.1)
RED BLOOD COUNT: 3.72 10^6/UL (ref 4.2–5.4)
RED CELL DISTRIBUTION WIDTH: 12.6 % (ref 11.5–14.5)
SODIUM: 140 MMOL/L (ref 135–144)
TOTAL PROTEIN: 6.6 G/DL (ref 6.1–8.1)
TROPONIN-I: 0.02 NG/ML (ref 0–0.12)
WHITE BLOOD COUNT: 8.2 10^3/UL (ref 4.8–10.8)

## 2019-02-13 RX ADMIN — INSULIN ASPART 1 UNIT: 100 INJECTION, SOLUTION INTRAVENOUS; SUBCUTANEOUS at 18:09

## 2019-02-13 RX ADMIN — HYDROCODONE BITARTRATE AND ACETAMINOPHEN 1 TAB: 10; 325 TABLET ORAL at 14:45

## 2019-02-13 RX ADMIN — DOCUSATE SODIUM PRN MG: 100 CAPSULE, LIQUID FILLED ORAL at 20:31

## 2019-02-13 RX ADMIN — CEFTRIAXONE 1 MLS/HR: 1 INJECTION, SOLUTION INTRAVENOUS at 02:46

## 2019-02-13 RX ADMIN — METHIMAZOLE 1 MG: 5 TABLET ORAL at 08:28

## 2019-02-13 RX ADMIN — METHIMAZOLE SCH MG: 5 TABLET ORAL at 08:28

## 2019-02-13 RX ADMIN — HYDROCODONE BITARTRATE AND ACETAMINOPHEN 1 TAB: 10; 325 TABLET ORAL at 21:57

## 2019-02-13 RX ADMIN — CEFTRIAXONE SCH MLS/HR: 1 INJECTION, SOLUTION INTRAVENOUS at 02:46

## 2019-02-13 RX ADMIN — HYDROCODONE BITARTRATE AND ACETAMINOPHEN 1 TAB: 10; 325 TABLET ORAL at 10:14

## 2019-02-13 RX ADMIN — THIAMINE HYDROCHLORIDE 1 MLS/HR: 100 INJECTION, SOLUTION INTRAMUSCULAR; INTRAVENOUS at 10:14

## 2019-02-13 RX ADMIN — THIAMINE HYDROCHLORIDE 1 MLS/HR: 100 INJECTION, SOLUTION INTRAMUSCULAR; INTRAVENOUS at 01:04

## 2019-02-13 RX ADMIN — INSULIN ASPART 1 UNIT: 100 INJECTION, SOLUTION INTRAVENOUS; SUBCUTANEOUS at 12:36

## 2019-02-13 RX ADMIN — INSULIN ASPART 1 UNIT: 100 INJECTION, SOLUTION INTRAVENOUS; SUBCUTANEOUS at 08:29

## 2019-02-13 RX ADMIN — INSULIN GLARGINE 1 UNITS: 100 INJECTION, SOLUTION SUBCUTANEOUS at 20:28

## 2019-02-13 RX ADMIN — INSULIN GLARGINE SCH UNITS: 100 INJECTION, SOLUTION SUBCUTANEOUS at 20:28

## 2019-02-13 RX ADMIN — INSULIN ASPART 1 UNIT: 100 INJECTION, SOLUTION INTRAVENOUS; SUBCUTANEOUS at 20:27

## 2019-02-13 RX ADMIN — ATORVASTATIN CALCIUM 1 MG: 80 TABLET, FILM COATED ORAL at 20:24

## 2019-02-13 RX ADMIN — DIPHENHYDRAMINE HYDROCHLORIDE SCH MG: 50 INJECTION, SOLUTION INTRAMUSCULAR; INTRAVENOUS at 08:28

## 2019-02-13 RX ADMIN — DEXAMETHASONE SODIUM PHOSPHATE PRN MG: 10 INJECTION, SOLUTION INTRAMUSCULAR; INTRAVENOUS at 12:03

## 2019-02-13 RX ADMIN — ONDANSETRON HYDROCHLORIDE 1 MG: 2 INJECTION, SOLUTION INTRAMUSCULAR; INTRAVENOUS at 12:03

## 2019-02-13 RX ADMIN — BISACODYL 1 MG: 5 TABLET, COATED ORAL at 20:31

## 2019-02-13 RX ADMIN — FOLIC ACID SCH MLS/HR: 5 INJECTION, SOLUTION INTRAMUSCULAR; INTRAVENOUS; SUBCUTANEOUS at 10:14

## 2019-02-13 RX ADMIN — HYDROCODONE BITARTRATE AND ACETAMINOPHEN 1 TAB: 10; 325 TABLET ORAL at 01:04

## 2019-02-13 RX ADMIN — FOLIC ACID SCH MLS/HR: 5 INJECTION, SOLUTION INTRAMUSCULAR; INTRAVENOUS; SUBCUTANEOUS at 01:04

## 2019-02-13 RX ADMIN — DOCUSATE SODIUM 1 MG: 100 CAPSULE, LIQUID FILLED ORAL at 20:31

## 2019-02-13 RX ADMIN — FAMOTIDINE 1 MG: 10 INJECTION, SOLUTION INTRAVENOUS at 08:28

## 2019-02-13 RX ADMIN — ATORVASTATIN CALCIUM SCH MG: 80 TABLET, FILM COATED ORAL at 20:24

## 2019-02-13 RX ADMIN — BISACODYL PRN MG: 5 TABLET, COATED ORAL at 20:31

## 2019-02-13 NOTE — PN
Date/Time of Note


Date/Time of Note


DATE: 2/13/19 


TIME: 21:21





Assessment/Plan


Lines/Catheters


IV Catheter Type (from Nrs):  Peripheral IV





Assessment/Plan


Chief Complaint/Hosp Course


1.  Symptomatic cholelithiasis: Status post laparoscopic cholecystectomy 


2/12/19-IS


-ambulate


-ice pack to abdominal wall


-diet as tolerated


-DC planning okay from surgical standpoint


2.  Abdominal pain: 2/2?  #1 versus other:


-As above


-Pain management


3.Thrombocytosis:


-Trend


-Further workup if persistent


4.  Normocytic normochromic anemia:


-Monitor and transfuse as needed


5.  Diabetes


-diet/med optimization


7.  Hyperthyroidism:


-Med management


8.  Hypoalbuminemia:


-Eventual nutrition optimization


9.  Pyuria


-Frequent bladder emptying





Thank you.  Patient seen and examined in collaboration with Dr. Dawit Styles.





Subjective


24 Hr Interval Summary


Status post laparoscopic cholecystectomy yesterday.  Minimal abdominal 


discomfort.  No abdominal pain associated with eating.  No fevers, chills, sob, 


congested cough, cp, palpitations, ha, dizziness, nausea, vomiting, diarrhea, 


dysuria.





Exam/Review of Systems


Vital Signs


Vitals





Vital Signs


  Date      Temp  Pulse  Resp  B/P (MAP)   Pulse Ox  O2          O2 Flow    FiO2


Time                                                 Delivery    Rate


   2/13/19  97.7     75    18      139/99        98


     14:18                          (112)


   2/12/19                                           Room Air


     15:31


   2/12/19                                                             6.0


     10:15








Intake and Output





2/12/19 2/12/19 2/13/19





1515:00


23:00


07:00





IntakeIntake Total


2040 ml


440 ml


1250 ml





OutputOutput Total


10 ml





BalanceBalance


2030 ml


440 ml


1250 ml














Exam


Free Text/Dictation


Constitutional:  alert, oriented


Psych:  nl mood/affect, anxiety (Minimal)


Head:  normocephalic, atraumatic


Eyes:  nl conjunctiva, EOMI, nl lids, nl sclera


ENMT:  nl external ears & nose, nl lips & teeth, mucosa pink and moist


Neck:  supple, non-tender


Respiratory:  normal air movement; 


   No congested cough


Cardiovascular:  regular rate and rhythm, nl pulses


Gastrointestinal:  soft, tender (incision sites, incision sites dry without 


drainage/discoloration/bruising); 


   No distended, No firm


Musculoskeletal:  nl extremities to inspection, nl gait and stance


Extremities:  normal pulses; 


   No edema


Neurological:  nl mental status, nl speech, nl strength


Skin:  nl turgor; 


   No rash or lesions


Lymph:  nl lymph nodes





Results


Result Diagram:  


2/13/19 0424                                                                    


           2/13/19 0424














SHANT ARAGON NP       Feb 13, 2019 21:25

## 2019-02-13 NOTE — PN
Date/Time of Note


Date/Time of Note


DATE: 2/13/19 


TIME: 17:45





Assessment/Plan


VTE Prophylaxis


Risk score (from Nsg)>0 risk:  3


SCD applied (from Nsg):  Yes


SCD contraindicated:  low risk/ambulating


Pharmacological prophylaxis:  LMWH





Lines/Catheters


IV Catheter Type (from Nrsg):  Peripheral IV





Assessment/Plan


Hospital Course





Assessment and plan


1.  Symptomatic cholelithiasis, sp lap chol; stable treat pain


2.  Type 2 diabetes/metabolic syndrome


3.  Anemia stable


4.  Cystitis?  3-day therapy


5.  Subclinical hyperparathyroidism


6.  Accelerated hypertension, treat pain


7.  Neck left arm pain probably musculoskeletal rule out ACS





S: 


2/11 no events.  No fever vomiting


2/12 events noted


2/13: Nonexertional neck left arm pain unable to characterize.  No previous 


similar discomfort in the past.  No nausea vomiting diaphoresis.





O: Vss





PE


No pallor icterus


Regular


Clear


Bs dimin mod tender; nd, no RRG


No edema; rom shoulder intact


Result Diagram:  


2/13/19 0424                                                                    


           2/13/19 0424





Results 24hrs





Laboratory Tests


Test
                 2/12/19
20:50  2/13/19
02:23  2/13/19
04:24  2/13/19
08:26


Bedside Glucose              226  H          203                           148


White Blood Count                                           8.2


Red Blood Count                                           3.72  L


Hemoglobin                                                11.0  L


Hematocrit                                                33.0  L


Mean Corpuscular                                           88.7


Volume


Mean Corpuscular                                           29.6


Hemoglobin


Mean Corpuscular      
              
                    33.3  
  



Hemoglobin
Concent


Red Cell                                                   12.6


Distribution Width


Platelet Count                                             417  H


Mean Platelet Volume                                      10.7  H


Immature                                                  0.400


Granulocytes %


Neutrophils %                                              66.2


Lymphocytes %                                              24.7


Monocytes %                                                 8.3


Eosinophils %                                               0.0


Basophils %                                                 0.4


Nucleated Red Blood                                         0.0


Cells %


Immature                                                  0.030


Granulocytes #


Neutrophils #                                               5.4


Lymphocytes #                                               2.0


Monocytes #                                                 0.7


Eosinophils #                                               0.0


Basophils #                                                 0.0


Nucleated Red Blood                                         0.0


Cells #


Sodium Level                                                140


Potassium Level                                             3.7


Chloride Level                                              105


Carbon Dioxide Level                                         23


Anion Gap                                                    12


Blood Urea Nitrogen                                           7


Creatinine                                                0.43  L


Est Glomerular        
              
              > 60  
        



Filtrat Rate
mL/min


Glucose Level                                               183


Calcium Level                                               9.1


Total Bilirubin                                            0.1  L


Direct Bilirubin                                           0.00


Indirect Bilirubin                                          0.1


Aspartate Amino       
              
                      42  
  



Transf
(AST/SGOT)


Alanine               
              
                      46  
  



Aminotransferase
(AL


T/SGPT)


Alkaline Phosphatase                                         93


Total Protein                                               6.6


Albumin                                                    3.2  L


Globulin                                                  3.40  H


Albumin/Globulin                                           0.94


Ratio


Test
                 2/13/19
12:32  2/13/19
15:50  
              



Bedside Glucose              241  H


Troponin I                                 0.021








Exam/Review of Systems


Exam


Vitals





Vital Signs


  Date      Temp  Pulse  Resp  B/P (MAP)   Pulse Ox  O2          O2 Flow    FiO2


Time                                                 Delivery    Rate


   2/13/19  97.7     75    18      139/99        98


     14:18                          (112)


   2/12/19                                           Room Air


     15:31


   2/12/19                                                             6.0


     10:15








Intake and Output





2/12/19 2/12/19 2/13/19





1515:00


23:00


07:00





IntakeIntake Total


2040 ml


440 ml


1250 ml





OutputOutput Total


10 ml





BalanceBalance


2030 ml


440 ml


1250 ml














Results


Results 24hrs





Laboratory Tests


Test
                 2/12/19
20:50  2/13/19
02:23  2/13/19
04:24  2/13/19
08:26


Bedside Glucose              226  H          203                           148


White Blood Count                                           8.2


Red Blood Count                                           3.72  L


Hemoglobin                                                11.0  L


Hematocrit                                                33.0  L


Mean Corpuscular                                           88.7


Volume


Mean Corpuscular                                           29.6


Hemoglobin


Mean Corpuscular      
              
                    33.3  
  



Hemoglobin
Concent


Red Cell                                                   12.6


Distribution Width


Platelet Count                                             417  H


Mean Platelet Volume                                      10.7  H


Immature                                                  0.400


Granulocytes %


Neutrophils %                                              66.2


Lymphocytes %                                              24.7


Monocytes %                                                 8.3


Eosinophils %                                               0.0


Basophils %                                                 0.4


Nucleated Red Blood                                         0.0


Cells %


Immature                                                  0.030


Granulocytes #


Neutrophils #                                               5.4


Lymphocytes #                                               2.0


Monocytes #                                                 0.7


Eosinophils #                                               0.0


Basophils #                                                 0.0


Nucleated Red Blood                                         0.0


Cells #


Sodium Level                                                140


Potassium Level                                             3.7


Chloride Level                                              105


Carbon Dioxide Level                                         23


Anion Gap                                                    12


Blood Urea Nitrogen                                           7


Creatinine                                                0.43  L


Est Glomerular        
              
              > 60  
        



Filtrat Rate
mL/min


Glucose Level                                               183


Calcium Level                                               9.1


Total Bilirubin                                            0.1  L


Direct Bilirubin                                           0.00


Indirect Bilirubin                                          0.1


Aspartate Amino       
              
                      42  
  



Transf
(AST/SGOT)


Alanine               
              
                      46  
  



Aminotransferase
(AL


T/SGPT)


Alkaline Phosphatase                                         93


Total Protein                                               6.6


Albumin                                                    3.2  L


Globulin                                                  3.40  H


Albumin/Globulin                                           0.94


Ratio


Test
                 2/13/19
12:32  2/13/19
15:50  
              



Bedside Glucose              241  H


Troponin I                                 0.021








Medications


Medication





Current Medications


Sodium Chloride 1,000 ml @  100 mls/hr Q10H IV  Last administered on 2/13/19at 


10:14; Admin Dose 100 MLS/HR;  Start 2/8/19 at 21:30


IV Flush (NS 3 ml) 3 ml PER PROTOCOL IV ;  Start 2/8/19 at 21:30


Hydromorphone HCl (Dilaudid) 0.5 mg Q4H  PRN IV .SEVERE PAIN 7-10 Last 


administered on 2/11/19at 20:26; Admin Dose 0.5 MG;  Start 2/8/19 at 21:30


Docusate Sodium (Colace) 100 mg Q12H  PRN PO .CONSTIPATION Last administered on 


2/12/19at 21:00; Admin Dose 100 MG;  Start 2/8/19 at 21:30


Bisacodyl (Dulcolax) 5 mg DAILY  PRN PO .CONSTIPATION Last administered on 


2/12/19at 21:00; Admin Dose 5 MG;  Start 2/8/19 at 21:30


Atorvastatin Calcium (Lipitor) 80 mg QHS PO  Last administered on 2/12/19at 


20:51; Admin Dose 80 MG;  Start 2/8/19 at 22:00


Ceftriaxone Sodium 50 ml @  100 mls/hr Q24H IVPB  Last administered on 2/13/19at


02:46; Admin Dose 100 MLS/HR;  Start 2/9/19 at 02:30


Miscellaneous Information 1 ea NOTE XX ;  Start 2/9/19 at 10:30


Glucose (Glutose) 15 gm Q15M  PRN PO DECREASED GLUCOSE;  Start 2/9/19 at 10:30


Glucose (Glutose) 22.5 gm Q15M  PRN PO DECREASED GLUCOSE;  Start 2/9/19 at 10:30


Dextrose (D50w Syringe) 25 ml Q15M  PRN IV DECREASED GLUCOSE;  Start 2/9/19 at 


10:30


Dextrose (D50w Syringe) 50 ml Q15M  PRN IV DECREASED GLUCOSE;  Start 2/9/19 at 


10:30


Glucagon (Glucagen) 1 mg Q15M  PRN IM DECREASED GLUCOSE;  Start 2/9/19 at 10:30


Glucose (Glutose) 15 gm Q15M  PRN BUCCAL DECREASED GLUCOSE;  Start 2/9/19 at 


10:30


Diagnostic Test (Pha) (Accu-Chek) 1 ea 02 XX  Last administered on 2/13/19at 


02:41; Admin Dose 1 EA;  Start 2/10/19 at 02:00


Insulin Aspart (Novolog Insulin Pen) NOVOLOG *MILD* ALGORITHM WITH MEALS  


BEDTIME SC  Last administered on 2/13/19at 12:36; Admin Dose 3 UNIT;  Start 


2/9/19 at 17:55


Methimazole (Tapazole) 10 mg DAILY PO  Last administered on 2/13/19at 08:28; 


Admin Dose 10 MG;  Start 2/10/19 at 13:30


Insulin Glargine (Lantus) 4 units DAILY@2000 SC  Last administered on 2/12/19at 


20:55; Admin Dose 4 UNITS;  Start 2/11/19 at 20:00


Famotidine (Pepcid Iv) 20 mg DAILY IV  Last administered on 2/13/19at 08:28; 


Admin Dose 20 MG;  Start 2/12/19 at 09:00


Ondansetron HCl (Zofran Inj) 4 mg Q4H  PRN IV NAUSEA/VOMITING Last administered 


on 2/13/19at 12:03; Admin Dose 4 MG;  Start 2/12/19 at 12:30


Acetaminophen/ Hydrocodone Bitart (Norco (10/325)) 1 tab Q4H  PRN PO MODERATE 


PAIN LEVEL 4-6 Last administered on 2/13/19at 14:45; Admin Dose 1 TAB;  Start 


2/12/19 at 18:00











JUJU REBOLLAR MD         Feb 13, 2019 17:46

## 2019-02-13 NOTE — NUR
End of Shift Summary: 

S/P LAPAROSCOPIC CHOLECYSTECTOMY on 2/12

Pt received norco for pain management. 

SCD's in place. 

Last BM on 2/8. Pt received colace and dulcolax. 

Blood sugar 226 and 203 during shift. Pt received 2 units of novolog and lantus 4 units. 

IV site is intact and asystematic. IV fluids and IVPB infusing as ordered. 

Encouraged incentive spirometer use Q1hr x10 while awake. Teaching provided and patient 
understands instructions. 

Safety precautions maintained throughout the shift. Bed in lowest position and bed alarm 
activated. Call light within reach. Hourly rounding rendered. All needs met.

## 2019-02-13 NOTE — NUR
END OF SHIFT NOTE:

Pt with complaints of neck pain throughout shift, PRN [place] given. At 1400, pt complained of 
neck pain radiating down left arm with tingling in fingers, [place] to the bedside, Dr. Azar notified. EKG, chest xray, and troponins ordered, will endorse to night RN to 
monitor. AccuCheck done ACHS, insulin given per policy. VSS, call bell within reach, hourly 
rounding maintained.

## 2019-02-14 VITALS — HEART RATE: 78 BPM | SYSTOLIC BLOOD PRESSURE: 138 MMHG | DIASTOLIC BLOOD PRESSURE: 63 MMHG | RESPIRATION RATE: 19 BRPM

## 2019-02-14 VITALS — DIASTOLIC BLOOD PRESSURE: 66 MMHG | SYSTOLIC BLOOD PRESSURE: 136 MMHG | RESPIRATION RATE: 18 BRPM | HEART RATE: 77 BPM

## 2019-02-14 VITALS — SYSTOLIC BLOOD PRESSURE: 165 MMHG | HEART RATE: 82 BPM | RESPIRATION RATE: 18 BRPM | DIASTOLIC BLOOD PRESSURE: 77 MMHG

## 2019-02-14 VITALS — SYSTOLIC BLOOD PRESSURE: 148 MMHG | RESPIRATION RATE: 18 BRPM | DIASTOLIC BLOOD PRESSURE: 78 MMHG | HEART RATE: 86 BPM

## 2019-02-14 LAB
ADD MAN DIFF?: NO
ALANINE AMINOTRANSFERASE: 37 IU/L (ref 13–69)
ALBUMIN/GLOBULIN RATIO: 1
ALBUMIN: 3 G/DL (ref 3.3–4.9)
ALKALINE PHOSPHATASE: 91 IU/L (ref 42–121)
ANION GAP: 8 (ref 5–13)
ASPARTATE AMINO TRANSFERASE: 27 IU/L (ref 15–46)
BASOPHIL #: 0 10^3/UL (ref 0–0.1)
BASOPHILS %: 0.3 % (ref 0–2)
BILIRUBIN,DIRECT: 0 MG/DL (ref 0–0.2)
BILIRUBIN,TOTAL: 0.1 MG/DL (ref 0.2–1.3)
BLOOD UREA NITROGEN: 5 MG/DL (ref 7–20)
CALCIUM: 9 MG/DL (ref 8.4–10.2)
CARBON DIOXIDE: 30 MMOL/L (ref 21–31)
CHLORIDE: 100 MMOL/L (ref 97–110)
CREATININE: 0.42 MG/DL (ref 0.44–1)
EOSINOPHILS #: 0.1 10^3/UL (ref 0–0.5)
EOSINOPHILS %: 0.8 % (ref 0–7)
GLOBULIN: 3 G/DL (ref 1.3–3.2)
GLUCOSE: 188 MG/DL (ref 70–220)
HEMATOCRIT: 34.1 % (ref 37–47)
HEMOGLOBIN: 11.2 G/DL (ref 12–16)
IMMATURE GRANS #M: 0.02 10^3/UL (ref 0–0.03)
IMMATURE GRANS % (M): 0.3 % (ref 0–0.43)
LIPASE: 59 U/L (ref 23–300)
LYMPHOCYTES #: 2 10^3/UL (ref 0.8–2.9)
LYMPHOCYTES %: 32.3 % (ref 15–51)
MAGNESIUM: 1.9 MG/DL (ref 1.7–2.5)
MEAN CORPUSCULAR HEMOGLOBIN: 29.2 PG (ref 29–33)
MEAN CORPUSCULAR HGB CONC: 32.8 G/DL (ref 32–37)
MEAN CORPUSCULAR VOLUME: 89 FL (ref 82–101)
MEAN PLATELET VOLUME: 10.6 FL (ref 7.4–10.4)
MONOCYTE #: 0.7 10^3/UL (ref 0.3–0.9)
MONOCYTES %: 11 % (ref 0–11)
NEUTROPHIL #: 3.4 10^3/UL (ref 1.6–7.5)
NEUTROPHILS %: 55.3 % (ref 39–77)
NUCLEATED RED BLOOD CELLS #: 0 10^3/UL (ref 0–0)
NUCLEATED RED BLOOD CELLS%: 0 /100WBC (ref 0–0)
PHOSPHORUS: 2.2 MG/DL (ref 2.5–4.9)
PLATELET COUNT: 376 10^3/UL (ref 140–415)
POTASSIUM: 3.8 MMOL/L (ref 3.5–5.1)
RED BLOOD COUNT: 3.83 10^6/UL (ref 4.2–5.4)
RED CELL DISTRIBUTION WIDTH: 12.9 % (ref 11.5–14.5)
SODIUM: 138 MMOL/L (ref 135–144)
TOTAL PROTEIN: 6 G/DL (ref 6.1–8.1)
TROPONIN-I: 0.01 NG/ML (ref 0–0.12)
TSH RECEPTOR ANTIBODY: 36
WHITE BLOOD COUNT: 6.1 10^3/UL (ref 4.8–10.8)

## 2019-02-14 RX ADMIN — HYDROCODONE BITARTRATE AND ACETAMINOPHEN 1 TAB: 10; 325 TABLET ORAL at 20:21

## 2019-02-14 RX ADMIN — INSULIN ASPART 1 UNIT: 100 INJECTION, SOLUTION INTRAVENOUS; SUBCUTANEOUS at 13:06

## 2019-02-14 RX ADMIN — INSULIN GLARGINE 1 UNITS: 100 INJECTION, SOLUTION SUBCUTANEOUS at 23:24

## 2019-02-14 RX ADMIN — HYDROCODONE BITARTRATE AND ACETAMINOPHEN 1 TAB: 10; 325 TABLET ORAL at 10:32

## 2019-02-14 RX ADMIN — POLYETHYLENE GLYCOL 3350 1 GM: 17 POWDER, FOR SOLUTION ORAL at 20:21

## 2019-02-14 RX ADMIN — ATORVASTATIN CALCIUM 1 MG: 80 TABLET, FILM COATED ORAL at 20:21

## 2019-02-14 RX ADMIN — INSULIN GLARGINE SCH UNITS: 100 INJECTION, SOLUTION SUBCUTANEOUS at 23:24

## 2019-02-14 RX ADMIN — DIPHENHYDRAMINE HYDROCHLORIDE SCH MG: 50 INJECTION, SOLUTION INTRAMUSCULAR; INTRAVENOUS at 09:04

## 2019-02-14 RX ADMIN — INSULIN ASPART 1 UNIT: 100 INJECTION, SOLUTION INTRAVENOUS; SUBCUTANEOUS at 22:46

## 2019-02-14 RX ADMIN — INSULIN GLARGINE 1 UNITS: 100 INJECTION, SOLUTION SUBCUTANEOUS at 00:43

## 2019-02-14 RX ADMIN — METHIMAZOLE SCH MG: 5 TABLET ORAL at 09:02

## 2019-02-14 RX ADMIN — METHIMAZOLE 1 MG: 5 TABLET ORAL at 09:02

## 2019-02-14 RX ADMIN — ATORVASTATIN CALCIUM SCH MG: 80 TABLET, FILM COATED ORAL at 20:21

## 2019-02-14 RX ADMIN — DEXAMETHASONE SODIUM PHOSPHATE PRN MG: 10 INJECTION, SOLUTION INTRAMUSCULAR; INTRAVENOUS at 10:35

## 2019-02-14 RX ADMIN — INSULIN ASPART 1 UNIT: 100 INJECTION, SOLUTION INTRAVENOUS; SUBCUTANEOUS at 17:42

## 2019-02-14 RX ADMIN — ONDANSETRON HYDROCHLORIDE 1 MG: 2 INJECTION, SOLUTION INTRAMUSCULAR; INTRAVENOUS at 10:35

## 2019-02-14 RX ADMIN — POLYETHYLENE GLYCOL 3350 SCH GM: 17 POWDER, FOR SOLUTION ORAL at 09:02

## 2019-02-14 RX ADMIN — FAMOTIDINE 1 MG: 10 INJECTION, SOLUTION INTRAVENOUS at 09:04

## 2019-02-14 RX ADMIN — POLYETHYLENE GLYCOL 3350 SCH GM: 17 POWDER, FOR SOLUTION ORAL at 20:21

## 2019-02-14 RX ADMIN — INSULIN ASPART 1 UNIT: 100 INJECTION, SOLUTION INTRAVENOUS; SUBCUTANEOUS at 09:12

## 2019-02-14 RX ADMIN — POLYETHYLENE GLYCOL 3350 1 GM: 17 POWDER, FOR SOLUTION ORAL at 09:02

## 2019-02-14 NOTE — PN
Date/Time of Note


Date/Time of Note


DATE: 2/14/19 


TIME: 23:44





Assessment/Plan


VTE Prophylaxis


Risk score (from Nsg)>0 risk:  4


SCD applied (from Nsg):  Yes


SCD contraindicated:  low risk/ambulating


Pharmacological prophylaxis:  LMWH





Lines/Catheters


IV Catheter Type (from Nrsg):  Peripheral IV





Assessment/Plan


Hospital Course





Assessment and plan


1.  Symptomatic cholelithiasis, sp lap chol; stable treat pain


2.  Type 2 diabetes/metabolic syndrome


3.  Anemia stable


4.  Cystitis?  3-day therapy


5.  Subclinical hyperparathyroidism


6.  Accelerated hypertension, treat pain


7.  Neck left arm pain probably musculoskeletal rule out ACS





S: 


2/11 no events.  No fever vomiting


2/12 events noted


2/13: Nonexertional neck left arm pain unable to characterize.  No previous 


similar discomfort in the past.  No nausea vomiting diaphoresis.


2/14: much better





O: Vss





PE


No pallor icterus


Regular


Clear


Bs dimin mod tender; nd, no RRG


No edema; rom shoulder intact


Result Diagram:  


2/14/19 0435                                                                    


           2/14/19 0439





Results 24hrs





Laboratory Tests


Test
                 2/14/19
00:39  2/14/19
02:34  2/14/19
04:35  2/14/19
04:39


Bedside Glucose              243  H          207


White Blood Count                                          6.1  #


Red Blood Count                                           3.83  L


Hemoglobin                                                11.2  L


Hematocrit                                                34.1  L


Mean Corpuscular                                           89.0


Volume


Mean Corpuscular                                           29.2


Hemoglobin


Mean Corpuscular      
              
                    32.8  
  



Hemoglobin
Concent


Red Cell                                                   12.9


Distribution Width


Platelet Count                                              376


Mean Platelet Volume                                      10.6  H


Immature                                                  0.300


Granulocytes %


Neutrophils %                                              55.3


Lymphocytes %                                              32.3


Monocytes %                                                11.0


Eosinophils %                                               0.8


Basophils %                                                 0.3


Nucleated Red Blood                                         0.0


Cells %


Immature                                                  0.020


Granulocytes #


Neutrophils #                                               3.4


Lymphocytes #                                               2.0


Monocytes #                                                 0.7


Eosinophils #                                               0.1


Basophils #                                                 0.0


Nucleated Red Blood                                         0.0


Cells #


Sodium Level                                                               138


Potassium Level                                                            3.8


Chloride Level                                                             100


Carbon Dioxide Level                                                        30


Anion Gap                                                                    8


Blood Urea Nitrogen                                                         5  L


Creatinine                                                               0.42  L


Est Glomerular        
              
              
              > 60  



Filtrat Rate
mL/min


Glucose Level                                                              188


Calcium Level                                                              9.0


Phosphorus Level                                                          2.2  L


Magnesium Level                                                            1.9


Total Bilirubin                                                           0.1  L


Direct Bilirubin                                                          0.00


Indirect Bilirubin                                                         0.1


Aspartate Amino       
              
              
                      27  



Transf
(AST/SGOT)


Alanine               
              
              
                      37  



Aminotransferase
(AL


T/SGPT)


Alkaline Phosphatase                                                        91


Troponin I                                                               0.013


Total Protein                                                             6.0  L


Albumin                                                                   3.0  L


Globulin                                                                  3.00


Albumin/Globulin                                                          1.00


Ratio


Lipase                                                                      59


Test
                 2/14/19
08:28  2/14/19
12:28  2/14/19
17:38  2/14/19
22:46


Bedside Glucose               154            152            157            177








Exam/Review of Systems


Exam


Vitals





Vital Signs


  Date      Temp  Pulse  Resp  B/P (MAP)   Pulse Ox  O2          O2 Flow    FiO2


Time                                                 Delivery    Rate


   2/14/19  98.1     82    18      165/77       100  Room Air


     20:00                          (106)


   2/12/19                                                             6.0


     10:15








Intake and Output





2/13/19 2/13/19 2/14/19





1515:00


23:00


07:00





IntakeIntake Total


1600 ml





BalanceBalance


1600 ml














Results


Results 24hrs





Laboratory Tests


Test
                 2/14/19
00:39  2/14/19
02:34  2/14/19
04:35  2/14/19
04:39


Bedside Glucose              243  H          207


White Blood Count                                          6.1  #


Red Blood Count                                           3.83  L


Hemoglobin                                                11.2  L


Hematocrit                                                34.1  L


Mean Corpuscular                                           89.0


Volume


Mean Corpuscular                                           29.2


Hemoglobin


Mean Corpuscular      
              
                    32.8  
  



Hemoglobin
Concent


Red Cell                                                   12.9


Distribution Width


Platelet Count                                              376


Mean Platelet Volume                                      10.6  H


Immature                                                  0.300


Granulocytes %


Neutrophils %                                              55.3


Lymphocytes %                                              32.3


Monocytes %                                                11.0


Eosinophils %                                               0.8


Basophils %                                                 0.3


Nucleated Red Blood                                         0.0


Cells %


Immature                                                  0.020


Granulocytes #


Neutrophils #                                               3.4


Lymphocytes #                                               2.0


Monocytes #                                                 0.7


Eosinophils #                                               0.1


Basophils #                                                 0.0


Nucleated Red Blood                                         0.0


Cells #


Sodium Level                                                               138


Potassium Level                                                            3.8


Chloride Level                                                             100


Carbon Dioxide Level                                                        30


Anion Gap                                                                    8


Blood Urea Nitrogen                                                         5  L


Creatinine                                                               0.42  L


Est Glomerular        
              
              
              > 60  



Filtrat Rate
mL/min


Glucose Level                                                              188


Calcium Level                                                              9.0


Phosphorus Level                                                          2.2  L


Magnesium Level                                                            1.9


Total Bilirubin                                                           0.1  L


Direct Bilirubin                                                          0.00


Indirect Bilirubin                                                         0.1


Aspartate Amino       
              
              
                      27  



Transf
(AST/SGOT)


Alanine               
              
              
                      37  



Aminotransferase
(AL


T/SGPT)


Alkaline Phosphatase                                                        91


Troponin I                                                               0.013


Total Protein                                                             6.0  L


Albumin                                                                   3.0  L


Globulin                                                                  3.00


Albumin/Globulin                                                          1.00


Ratio


Lipase                                                                      59


Test
                 2/14/19
08:28  2/14/19
12:28  2/14/19
17:38  2/14/19
22:46


Bedside Glucose               154            152            157            177








Medications


Medication





Current Medications


IV Flush (NS 3 ml) 3 ml PER PROTOCOL IV ;  Start 2/8/19 at 21:30


Hydromorphone HCl (Dilaudid) 0.5 mg Q4H  PRN IV .SEVERE PAIN 7-10 Last 


administered on 2/11/19at 20:26; Admin Dose 0.5 MG;  Start 2/8/19 at 21:30


Docusate Sodium (Colace) 100 mg Q12H  PRN PO .CONSTIPATION Last administered on 


2/13/19at 20:31; Admin Dose 100 MG;  Start 2/8/19 at 21:30


Bisacodyl (Dulcolax) 5 mg DAILY  PRN PO .CONSTIPATION Last administered on 2/13/ 19at 20:31; Admin Dose 5 MG;  Start 2/8/19 at 21:30


Atorvastatin Calcium (Lipitor) 80 mg QHS PO  Last administered on 2/14/19at 


20:21; Admin Dose 80 MG;  Start 2/8/19 at 22:00


Miscellaneous Information 1 ea NOTE XX ;  Start 2/9/19 at 10:30


Glucose (Glutose) 15 gm Q15M  PRN PO DECREASED GLUCOSE;  Start 2/9/19 at 10:30


Glucose (Glutose) 22.5 gm Q15M  PRN PO DECREASED GLUCOSE;  Start 2/9/19 at 10:30


Dextrose (D50w Syringe) 25 ml Q15M  PRN IV DECREASED GLUCOSE;  Start 2/9/19 at 


10:30


Dextrose (D50w Syringe) 50 ml Q15M  PRN IV DECREASED GLUCOSE;  Start 2/9/19 at 


10:30


Glucagon (Glucagen) 1 mg Q15M  PRN IM DECREASED GLUCOSE;  Start 2/9/19 at 10:30


Glucose (Glutose) 15 gm Q15M  PRN BUCCAL DECREASED GLUCOSE;  Start 2/9/19 at 


10:30


Diagnostic Test (Pha) (Accu-Chek) 1 ea 02 XX  Last administered on 2/14/19at 


02:35; Admin Dose 1 EA;  Start 2/10/19 at 02:00


Insulin Aspart (Novolog Insulin Pen) NOVOLOG *MILD* ALGORITHM WITH MEALS  


BEDTIME SC  Last administered on 2/14/19at 17:42; Admin Dose 1 UNIT;  Start 


2/9/19 at 17:55


Methimazole (Tapazole) 10 mg DAILY PO  Last administered on 2/14/19at 09:02; 


Admin Dose 10 MG;  Start 2/10/19 at 13:30


Insulin Glargine (Lantus) 4 units DAILY@2000 SC  Last administered on 2/14/19at 


23:24; Admin Dose 4 UNITS;  Start 2/11/19 at 20:00


Famotidine (Pepcid Iv) 20 mg DAILY IV  Last administered on 2/14/19at 09:04; 


Admin Dose 20 MG;  Start 2/12/19 at 09:00


Ondansetron HCl (Zofran Inj) 4 mg Q4H  PRN IV NAUSEA/VOMITING Last administered 


on 2/14/19at 10:35; Admin Dose 4 MG;  Start 2/12/19 at 12:30


Acetaminophen/ Hydrocodone Bitart (Norco (10/325)) 1 tab Q4H  PRN PO MODERATE 


PAIN LEVEL 4-6 Last administered on 2/14/19at 20:21; Admin Dose 1 TAB;  Start 


2/12/19 at 18:00


Metformin HCl (Glucophage) 500 mg WITH BREAKFAST  DINNE PO  Last administered on


2/14/19at 17:43; Admin Dose 500 MG;  Start 2/13/19 at 17:55


Polyethylene Glycol (Miralax) 17 gm BID PO  Last administered on 2/14/19at 


20:21; Admin Dose 17 GM;  Start 2/14/19 at 09:00











JUJU REBOLLAR MD         Feb 14, 2019 23:45

## 2019-02-14 NOTE — NUR
End of Shift Summary: 

S/P LAPAROSCOPIC CHOLECYSTECTOMY on 2/12.

Pt is A&O x4. Vital signs within normal limits. No acute changes. Respiratory and 
hemodynamics remain stable.  

Patient denies chest pain, palpitations, shortness of breath, nausea, vomiting, headache, 
cough, or changes in bowel/bladder habits. 

Pt received norco for pain management. 

SCD's in place. 

Last BM on 2/8. Pt received colace and dulcolax. 

Blood sugar 280, 243 and 207 during shift. Pt received 3 units of novolog and lantus 4 units 
with additional 7 units. 

IV site is intact and asystematic. 

Lap sites are clean, dry, and intact. 

Encouraged incentive spirometer use Q1hr x10 while awake. Teaching provided and patient 
understands instructions. 

Safety precautions maintained throughout the shift. Bed in lowest position and bed alarm 
activated. Call light within reach. Hourly rounding rendered. All needs met.

## 2019-02-14 NOTE — PDOCDIS
Discharge Instructions


CONDITION


                 [id number]
Patient Condition:  [id number]
Good








HOME CARE INSTRUCTIONS:


         [id number]
Diet Instructions:  [id number]
Low Fat /Cholesterol








ACTIVITY:


          [id number]
Activity Restrictions:  [id number]
No Restrictions


                                             Slowly Increase Activity


                                             Avoid heavy lifting


          [id number]
Bathing Restrictions:   [id number]
Shower








FOLLOW UP/APPOINTMENTS


Follow-up Plan


appt PCP 1-2 wk


Dr MARCELINA Styles 1wk











JUJU REBOLLAR MD         Feb 14, 2019 18:49

## 2019-02-14 NOTE — PN
Date/Time of Note


Date/Time of Note


DATE: 2/14/19 


TIME: 11:14





Assessment/Plan


Lines/Catheters


IV Catheter Type (from Nrsg):  Peripheral IV





Assessment/Plan


Chief Complaint/Hosp Course


1.  Symptomatic cholelithiasis: Status post laparoscopic cholecystectomy 


2/12/19-IS


-ambulate


-ice pack to abdominal wall


-diet as tolerated


-DC planning okay from surgical standpoint with f/u in office in 1-2 weeks


2.  Abdominal pain: 2/2?  #1 versus other:


-As above


-Pain management


3.Thrombocytosis:


-Trend


-Further workup if persistent


4.  Normocytic normochromic anemia:


-Monitor and transfuse as needed


5.  Diabetes


-diet/med optimization


7.  Hyperthyroidism:


-Med management


8.  Hypoalbuminemia:


-Eventual nutrition optimization


9.  Pyuria


-Frequent bladder emptying





Thank you.  Patient seen and examined in collaboration with Dr. Dawit Styles.





Subjective


24 Hr Interval Summary


Tolerating diet without abdominal pain or discomfort. Min nausea. No fevers, ch


ills, sob, congested cough, cp, palpitations, ha, dizziness, n/v/d/dysuria.





Exam/Review of Systems


Vital Signs


Vitals





Vital Signs


  Date      Temp  Pulse  Resp  B/P (MAP)   Pulse Ox  O2          O2 Flow    FiO2


Time                                                 Delivery    Rate


   2/14/19  98.6     86    18      148/78        98  Room Air


     07:43                          (101)


   2/12/19                                                             6.0


     10:15








Intake and Output





2/13/19 2/13/19 2/14/19





1515:00


23:00


07:00





IntakeIntake Total


1600 ml





BalanceBalance


1600 ml














Exam


Free Text/Dictation


Constitutional:  alert, oriented


Psych:  nl mood/affect, anxiety (Minimal)


Head:  normocephalic, atraumatic


Eyes:  nl conjunctiva, EOMI, nl lids, nl sclera


ENMT:  nl external ears & nose, nl lips & teeth, mucosa pink and moist


Neck:  supple, non-tender


Respiratory:  normal air movement; 


   No congested cough


Cardiovascular:  regular rate and rhythm, nl pulses


Gastrointestinal:  soft, tender (incision sites, incision sites dry without 


drainage/discoloration/bruising); 


   No distended, No firm


Musculoskeletal:  nl extremities to inspection, nl gait and stance


Extremities:  normal pulses; 


   No edema


Neurological:  nl mental status, nl speech, nl strength


Skin:  nl turgor; 


   No rash or lesions


Lymph:  nl lymph nodes





Results


Result Diagram:  


2/14/19 0435                                                                    


           2/14/19 0439














SHANT ARAGON NP       Feb 14, 2019 11:15

## 2019-02-14 NOTE — NUR
RN NOTES

=alert oriented  comfortable at this time. diet tolerated  no n/v ,. assisted with minimal 
assist. passing gas.. incision on her abdomen intact ..

## 2019-02-15 VITALS — SYSTOLIC BLOOD PRESSURE: 124 MMHG | RESPIRATION RATE: 18 BRPM | DIASTOLIC BLOOD PRESSURE: 63 MMHG | HEART RATE: 71 BPM

## 2019-02-15 VITALS — SYSTOLIC BLOOD PRESSURE: 131 MMHG | DIASTOLIC BLOOD PRESSURE: 62 MMHG | HEART RATE: 68 BPM | RESPIRATION RATE: 19 BRPM

## 2019-02-15 VITALS — HEART RATE: 84 BPM | RESPIRATION RATE: 18 BRPM | SYSTOLIC BLOOD PRESSURE: 121 MMHG | DIASTOLIC BLOOD PRESSURE: 68 MMHG

## 2019-02-15 LAB
FREE T3: 4.19 PG/ML (ref 2.77–5.27)
FREE T4 (FREE THYROXINE): 2.26 NG/DL (ref 0.64–1.79)
THYROID STIMULATING HORMONE: 0.18 MIU/L (ref 0.47–4.68)

## 2019-02-15 RX ADMIN — INSULIN ASPART 1 UNIT: 100 INJECTION, SOLUTION INTRAVENOUS; SUBCUTANEOUS at 13:01

## 2019-02-15 RX ADMIN — DOCUSATE SODIUM PRN MG: 100 CAPSULE, LIQUID FILLED ORAL at 08:20

## 2019-02-15 RX ADMIN — DOCUSATE SODIUM 1 MG: 100 CAPSULE, LIQUID FILLED ORAL at 08:20

## 2019-02-15 RX ADMIN — BISACODYL 1 MG: 5 TABLET, COATED ORAL at 08:19

## 2019-02-15 RX ADMIN — POLYETHYLENE GLYCOL 3350 1 GM: 17 POWDER, FOR SOLUTION ORAL at 08:20

## 2019-02-15 RX ADMIN — INSULIN ASPART 1 UNIT: 100 INJECTION, SOLUTION INTRAVENOUS; SUBCUTANEOUS at 07:50

## 2019-02-15 RX ADMIN — METHIMAZOLE SCH MG: 5 TABLET ORAL at 08:20

## 2019-02-15 RX ADMIN — DIPHENHYDRAMINE HYDROCHLORIDE SCH MG: 50 INJECTION, SOLUTION INTRAMUSCULAR; INTRAVENOUS at 08:20

## 2019-02-15 RX ADMIN — POLYETHYLENE GLYCOL 3350 SCH GM: 17 POWDER, FOR SOLUTION ORAL at 08:20

## 2019-02-15 RX ADMIN — PROPYLTHIOURACIL 1 MG: 50 TABLET ORAL at 17:33

## 2019-02-15 RX ADMIN — SODIUM PHOSPHATE, DIBASIC AND SODIUM PHOSPHATE, MONOBASIC 1 ML: 7; 19 ENEMA RECTAL at 12:55

## 2019-02-15 RX ADMIN — FAMOTIDINE 1 MG: 10 INJECTION, SOLUTION INTRAVENOUS at 08:20

## 2019-02-15 RX ADMIN — METHIMAZOLE 1 MG: 5 TABLET ORAL at 08:20

## 2019-02-15 RX ADMIN — Medication 1 MG: at 12:04

## 2019-02-15 RX ADMIN — BISACODYL PRN MG: 5 TABLET, COATED ORAL at 08:19

## 2019-02-15 NOTE — DS
Date/Time of Note


Date/Time of Note


DATE: 2/15/19 


TIME: 12:00





Discharge Summary


Admission/Discharge Info


Admit Date/Time


Feb 9, 2019 at 10:08


Discharge Date/Time





Patient Condition:  Good


Consults


Dr MARCELINA Styles


Procedures





ULTRASOUND ABD


IMPRESSION:


Cholelithiasis without evidence of acute cholecystitis.


 Hepatic steatosis. 


_____________________________________________ 








CT ABD/PEL


IMPRESSION:


No acute abnormalities in the abdomen or pelvis. 


Cholelithiasis without CT evidence of acute cholecystitis.


Atherosclerotic disease.


 





HIDA


IMPRESSION: 


1.  Negative hepatobiliary scan.  There is normal filling of the gallbladder.


2.  Patent common bile duct with normal visualization of the small bowel.


_______________




















PATH: [phone]





PRELIMINARY MICROSCOPIC DIAGNOSIS:


 


A-Liver, biopsy:


-- Severe steatosis with moderate lobular necroinflammatory activity, granulomas


and focal 


interface activity.


-- Abnormal iron deposition in hepatocytes (please see comment).


 


B-Gallbladder, cholecystectomy:


-- Patchy mucosal denudation.


-- Cholelithiasis (gross only).


-- No evidence of malignancy.


 


COMMENT:  The liver shows necroinflammatory activities of the lobules with 


granulomas and focal 


interface activity.  However, the liver function test is normal.  The 


morphological impression 


does not correlate with the liver function tests and this case is forwarded to 


Dr. Pedro Ludwig of 


[facility] for his review.  A final report will follow. 


 


This case was reviewed by Dr. Salvador Chris who concurs.  


 


/


Date of Service:  2/12/19; Date Received:  2/12/19


Dictated:  2/14/19; Transcribed:  2/14/19; Sent by Fax:  2/14/19; Reviewed:  HERVE


Hx of Present Illness





58yr F w abd pain


Hospital Course





Hospital course -A/P


1.  Symptomatic cholelithiasis, sp lap chol; stable discharge home


2.  Type 2 diabetes/metabolic syndrome


3.  Anemia stable


4.  Cystitis?  3-day therapy


5.  Subclinical hyperthyroidism outpatient endocrine eval


6.  Accelerated hypertension, treat pain


7.  Neck left arm pain probably musculoskeletal. ruled out ACS


8.  Constipation





S: 


2/11 no events.  No fever vomiting


2/12 events noted


2/13: Nonexertional neck left arm pain unable to characterize.  No previous 


similar discomfort in the past.  No nausea vomiting diaphoresis.


2/14: much better


2/15: No events





O: Vss





PE


No pallor icterus


Regular


Clear


Bs dimin mod tender; nd, no RRG


No edema; rom shoulder intact


Home Meds


Active Scripts


Docusate Sodium* (Docusate Sodium*) 100 Mg Capsule, 100 MG PO Q12H PRN for 


.CONSTIPATION for 7 Days, #20 CAP


   otc


   Prov:JUJU REBOLLAR MD         2/14/19


Polyethylene Glycol* (Miralax*) 17 Gm Powd.pack, 17 GM PO BID for 7 Days


   otc


   Prov:JUJU REBOLLAR MD         2/14/19


Hydrocodone/Acetaminophen (Hydrocodone-Acetamin  mg) 1 Each Tablet, 1 TAB 


PO Q4H PRN for MODERATE PAIN LEVEL 4-6 for 24 Days, TAB


   Prov:JUJU REBOLLAR MD         2/14/19


Reported Medications


Ondansetron Hcl* (Zofran*) 4 Mg Tablet, 4 MG PO Q8, TAB


   2/8/19


Tramadol Hcl* (Ultram*) 50 Mg Tablet, 50 MG PO Q12H PRN for PAIN, TAB


   2/8/19


Metformin Hcl* (Metformin Hcl*) 500 Mg Tablet, 500 MG PO WITH BREAKFAST DINNE, 


#60 TAB


   2/8/19


Atorvastatin* (Atorvastatin*) 80 Mg Tablet, 80 MG PO QHS, #30 TAB


   2/8/19


Follow-up Plan


appt PCP 1-2 wk


Dr MARCELINA Styles 1wk


Primary Care Provider


[facility]


Time spent on discharge:   > 30 minutes


Pending Labs





Laboratory Tests


Test
              2/14/19
12:28   2/14/19
17:38   2/14/19
22:46   2/15/19
04:33


Bedside                      152             157             177  



Glucose
          mg/dL
()  mg/dL
()  mg/dL
()


Thyroid          
                
               
                        0.180


Stimulating                                                       MIU/L
(0.465-4


Hormone
(TSH)                                                              .680)


Free Thyroxine
  
                
               
                         2.26


                                                                  ng/dl
(0.64-1.


                                                                             79)


Free             
                
               
                         4.19


Triiodothyronin                                                   pg/ml
(2.77-5.


e (T3)
pg/mL                                                                 27)


Test
              2/15/19
08:18  
               
               



Bedside                      134  
               
               



Glucose
          mg/dL
()














JUJU REBOLLAR MD         Feb 15, 2019 12:08

## 2019-02-15 NOTE — PN
Date/Time of Note


Date/Time of Note


DATE: 2/15/19 


TIME: 11:19





Assessment/Plan


Lines/Catheters


IV Catheter Type (from Nrs):  Saline Lock





Assessment/Plan


Chief Complaint/Hosp Course


1.  Symptomatic cholelithiasis: Status post laparoscopic cholecystectomy 


2/12/19-IS


-ambulate


-ice pack to abdominal wall


-diet as tolerated


-DC okay from surgical standpoint with f/u in office in 1-2 weeks


2.  Abdominal pain: 2/2?  #1 versus other:


-As above


-Pain management


3.Thrombocytosis:


-Trend


-Further workup if persistent


4.  Normocytic normochromic anemia:


-Monitor and transfuse as needed


5.  Diabetes


-diet/med optimization


7.  Hyperthyroidism:


-Med management


8.  Hypoalbuminemia:


-Eventual nutrition optimization


9.  Pyuria


-Frequent bladder emptying


10.  Constipation:


-Optimize bowel regimen





Thank you.  Patient seen and examined in collaboration with Dr. Dawit Styles.





Subjective


24 Hr Interval Summary


Feels well but constipated.  No fevers, chills, sob, congested cough, cp, 


palpitations, ha, dizziness, nausea, vomiting, diarrhea, dysuria.  No abdominal 


pain.





Exam/Review of Systems


Vital Signs


Vitals





Vital Signs


  Date      Temp  Pulse  Resp  B/P (MAP)   Pulse Ox  O2          O2 Flow    FiO2


Time                                                 Delivery    Rate


   2/15/19  98.2     68    19      131/62        98


     07:35                           (85)


   2/15/19                                           Room Air


     02:00


   2/12/19                                                             6.0


     10:15








Intake and Output





2/14/19


2/14/19


2/15/19





1515:00


23:00


07:00





IntakeIntake Total


240 ml


300 ml





BalanceBalance


240 ml


300 ml














Exam


Free Text/Dictation


Constitutional:  alert, oriented


Psych:  nl mood/affect, anxiety (Minimal)


Head:  normocephalic, atraumatic


Eyes:  nl conjunctiva, EOMI, nl lids, nl sclera


ENMT:  nl external ears & nose, nl lips & teeth, mucosa pink and moist


Neck:  supple, non-tender


Respiratory:  normal air movement; 


   No congested cough


Cardiovascular:  regular rate and rhythm, nl pulses


Gastrointestinal:  soft, tender (incision sites, incision sites dry without 


drainage/discoloration/bruising); 


   No distended, No firm


Musculoskeletal:  nl extremities to inspection, nl gait and stance


Extremities:  normal pulses; 


   No edema


Neurological:  nl mental status, nl speech, nl strength


Skin:  nl turgor; 


   No rash or lesions


Lymph:  nl lymph nodes





Results


Result Diagram:  


2/14/19 0435                                                                    


           2/14/19 0439














SHANT ARAGON NP       Feb 15, 2019 11:20

## 2019-02-15 NOTE — NUR
BI NOTES:  MET WITH THE PT AT THE BEDSIDE.  THROUGH THE , PT WAS GIVEN [facility] INFORMATION FOR OUTPT FOLLOW UP ON 2 ND FLOOR FOR PCP AND ENDO.  PER THE 
, PT'S  WILL PROVIDE TRANSPORTATION FROM HOSPITAL TO HOME TONIGHT.

BEDSIDE NURSE WAS INFORMED ALL THE UPDATE.



JULIO HOLLAND

LEAD CM

## 2019-02-15 NOTE — NUR
RN NOTES

=alert oriented comfortable ambulated to the hallway with steady gait , diet tolerated no 
n/v . 

-------------------------------------------------------------------------------

Addendum: 02/15/19 at 1805 by JAMES HUYNH RN

-------------------------------------------------------------------------------

=d/c instruction given and follow up appt given to the patient and  and  verbalized 
understanding 

=d/c on a stable condition

## 2022-09-29 NOTE — NUR
EOSS: alert, oriented, ambulatory with steady gait. Afebrile, c/o pain at bedtime, given PRN 
Norco with help. Tolerated all PO meds, denies nausea. Anticipating discharge today. 29-Sep-2022 09:26